# Patient Record
Sex: MALE | Race: WHITE | NOT HISPANIC OR LATINO | Employment: OTHER | URBAN - METROPOLITAN AREA
[De-identification: names, ages, dates, MRNs, and addresses within clinical notes are randomized per-mention and may not be internally consistent; named-entity substitution may affect disease eponyms.]

---

## 2017-01-04 RX ORDER — ROSUVASTATIN CALCIUM 20 MG/1
TABLET, FILM COATED ORAL
Qty: 90 TABLET | Refills: 2 | Status: SHIPPED | OUTPATIENT
Start: 2017-01-04 | End: 2017-12-28 | Stop reason: SDUPTHER

## 2017-01-17 PROBLEM — G50.0 TIC DOULOUREUX: Status: ACTIVE | Noted: 2017-01-17

## 2017-01-17 PROBLEM — I25.10 CAD (CORONARY ARTERY DISEASE): Status: ACTIVE | Noted: 2017-01-17

## 2017-01-17 PROBLEM — I25.9 IHD (ISCHEMIC HEART DISEASE): Status: ACTIVE | Noted: 2017-01-17

## 2017-01-17 PROBLEM — I10 HYPERTENSION: Status: ACTIVE | Noted: 2017-01-17

## 2017-01-17 PROBLEM — E78.5 DYSLIPIDEMIA: Status: ACTIVE | Noted: 2017-01-17

## 2017-12-28 RX ORDER — ROSUVASTATIN CALCIUM 20 MG/1
20 TABLET, COATED ORAL DAILY
Qty: 30 TABLET | Refills: 0 | Status: SHIPPED | OUTPATIENT
Start: 2017-12-28 | End: 2018-01-25 | Stop reason: SDUPTHER

## 2018-01-10 ENCOUNTER — LAB (OUTPATIENT)
Dept: LAB | Facility: HOSPITAL | Age: 63
End: 2018-01-10

## 2018-01-10 ENCOUNTER — OFFICE VISIT (OUTPATIENT)
Dept: CARDIOLOGY | Facility: CLINIC | Age: 63
End: 2018-01-10

## 2018-01-10 VITALS
HEART RATE: 69 BPM | DIASTOLIC BLOOD PRESSURE: 64 MMHG | WEIGHT: 163.6 LBS | BODY MASS INDEX: 24.23 KG/M2 | SYSTOLIC BLOOD PRESSURE: 122 MMHG | OXYGEN SATURATION: 96 % | HEIGHT: 69 IN

## 2018-01-10 DIAGNOSIS — E78.5 DYSLIPIDEMIA: ICD-10-CM

## 2018-01-10 DIAGNOSIS — I25.9 IHD (ISCHEMIC HEART DISEASE): ICD-10-CM

## 2018-01-10 DIAGNOSIS — R25.2 MUSCLE CRAMP: Primary | ICD-10-CM

## 2018-01-10 DIAGNOSIS — R42 LIGHT HEADEDNESS: ICD-10-CM

## 2018-01-10 DIAGNOSIS — I10 HYPERTENSION, UNSPECIFIED TYPE: ICD-10-CM

## 2018-01-10 LAB
ALBUMIN SERPL-MCNC: 4.4 G/DL (ref 3.2–4.8)
ALBUMIN/GLOB SERPL: 1.6 G/DL (ref 1.5–2.5)
ALP SERPL-CCNC: 49 U/L (ref 25–100)
ALT SERPL W P-5'-P-CCNC: 19 U/L (ref 7–40)
ANION GAP SERPL CALCULATED.3IONS-SCNC: 8 MMOL/L (ref 3–11)
ARTICHOKE IGE QN: 44 MG/DL (ref 0–130)
AST SERPL-CCNC: 26 U/L (ref 0–33)
BILIRUB SERPL-MCNC: 1.1 MG/DL (ref 0.3–1.2)
BUN BLD-MCNC: 16 MG/DL (ref 9–23)
BUN/CREAT SERPL: 16 (ref 7–25)
CALCIUM SPEC-SCNC: 8.9 MG/DL (ref 8.7–10.4)
CHLORIDE SERPL-SCNC: 106 MMOL/L (ref 99–109)
CHOLEST SERPL-MCNC: 84 MG/DL (ref 0–200)
CO2 SERPL-SCNC: 27 MMOL/L (ref 20–31)
CREAT BLD-MCNC: 1 MG/DL (ref 0.6–1.3)
GFR SERPL CREATININE-BSD FRML MDRD: 76 ML/MIN/1.73
GLOBULIN UR ELPH-MCNC: 2.8 GM/DL
GLUCOSE BLD-MCNC: 98 MG/DL (ref 70–100)
HDLC SERPL-MCNC: 28 MG/DL (ref 40–60)
POTASSIUM BLD-SCNC: 4.8 MMOL/L (ref 3.5–5.5)
PROT SERPL-MCNC: 7.2 G/DL (ref 5.7–8.2)
SODIUM BLD-SCNC: 141 MMOL/L (ref 132–146)
TRIGL SERPL-MCNC: 79 MG/DL (ref 0–150)

## 2018-01-10 PROCEDURE — 80061 LIPID PANEL: CPT

## 2018-01-10 PROCEDURE — 99213 OFFICE O/P EST LOW 20 MIN: CPT | Performed by: INTERNAL MEDICINE

## 2018-01-10 PROCEDURE — 80053 COMPREHEN METABOLIC PANEL: CPT

## 2018-01-10 RX ORDER — NITROGLYCERIN 400 UG/1
1 SPRAY ORAL
COMMUNITY

## 2018-01-10 NOTE — PROGRESS NOTES
Damien Sorto  1955  [phone]      01/10/2018    Titi Chan MD    Chief Complaint: Coronary Artery Disease      PROBLEM LIST:  1. Ischemic heart disease:  a. History of acute inferior myocardial infarction with symptoms of shortness of breath and severe chest  pain, treated with Streptokinase/PTCA and stent placement to the RCA in February 1998.  b. Left heart catheterization, 02/17/2000, revealing 40% LAD stenosis with an ejection fraction of greater than 60%.  c. Acute inferior myocardial infarction, 09/14/2007,  with placement of 1 stent to the RCA at [facility]; data deficient.  d. Left heart catheterization, 01/08/2009, revealing nonobstructive disease which was diffusely present throughout.  Patent right coronary stent.  LVEF of  55%.  e. GXT December 12, 2017 at [place]: Exercise duration 5 minutes with the achievement of 97% of expected maximal heart rate.  No evidence of ischemia by clinical or echocardiographic criteria.  The stress test was stopped secondary to leg pain and cramps.  There is no dyspnea or diaphoresis or chest pain.  2. Hypertension.  3. Dyslipidemia.  4. Remote tobacco abuse.  5.  Family history of premature coronary artery disease.  6. Tic douloureux.  7. Recent ankle fracture, December 2015.  8. Surgical history.  a.  Hemorrhoidectomy.  b. Vasectomy.  c. Tonsillectomy.  d. Tic douloureux neuralgia surgery.  e. Discectomy.    No Known Allergies    Current Medications:    Current Outpatient Prescriptions:   •  aspirin 81 MG tablet, Take 81 mg by mouth Daily., Disp: , Rfl:   •  Multiple Vitamins-Minerals (MULTIVITAMIN ADULTS 50+ PO), Take  by mouth As Needed., Disp: , Rfl:   •  nitroglycerin (NITROLINGUAL) 0.4 MG/SPRAY spray, Place 1 spray under the tongue Every 5 (Five) Minutes As Needed for Chest Pain., Disp: , Rfl:   •  rosuvastatin (CRESTOR) 20 MG tablet, Take 1 tablet by mouth Daily. Need lab work for further refills, Disp: 30 tablet,  "Rfl: 0    HPI  Damien Sorto returns today for follow up with a history of ischemic heart disease, hypertension, and dyslipidemia. Since last visit, patient has had a variety of issues with his legs and feet. He has been experiencing muscle cramps in his calves whenever he walks, especially when going uphill. When he stops and rests the cramps go away. He can walk about a half mile before feeling discomfort. His feels like his feet are swollen 3/4 of the time, and they stay \"ice cold.\" His last stress test was in December 2017. He also feels light headed when he stands up. This light-headedness goes away in a few seconds. Denies any complaints of chest pain, pressure, tightness, or unusual dyspnea. He no longer smokes. He follows a routine exercise regimen by walking half a mile every day.     The following portions of the patient's history were reviewed and updated as appropriate: allergies, current medications and problem list.    Pertinent positives as listed in the HPI.  All other systems reviewed are negative.    Vitals:    01/10/18 1312   BP: 122/64   BP Location: Left arm   Patient Position: Sitting   Pulse: 69   SpO2: 96%   Weight: 74.2 kg (163 lb 9.6 oz)   Height: 175.3 cm (69\")       General: Alert, awake, and oriented  Neck: Jugular venous pressure is within normal limits. Carotids have normal upstrokes without bruits.   Cardiovascular: Heart has a nondisplaced focal PMI. Regular rate and rhythm without murmur, gallop or rub.  Lungs: Clear without rales or wheezes. Equal expansion is noted.   Extremities: Show no edema.Pulses are palpable through this thick socks.  Skin: Warm and dry.  Neurologic: nonfocal    Diagnostic Data:  Procedures    Assessment:    ICD-10-CM ICD-9-CM   1. Muscle cramp R25.2 729.82   2. Light headedness R42 780.4   3. IHD (ischemic heart disease) I25.9 414.9   4. Hypertension, unspecified type I10 401.9   5. Dyslipidemia E78.5 272.4       Plan:    1. Continue a routine exercise " regimen; 30 minutes per day, 4-5 days per week   2. Perform Exercise JUDY to look for PVD today, January 10, 2018  3. Monitor BP at home to see if low BP is contributing to light-headedness   4. Start salting food to increase BP  5. Obtain lipid panel today, January 10, 2018  6. Continue current medications.  7. F/up in 12 months or sooner if needed.      Scribed for Yola Gleason MD by Sonali Lou. 1/10/2018  1:50 PM    I Yola Gleason MD personally performed the services described in this documentation as scribed by the above individual in my presence, and it is both accurate and complete.    Yola Gleason MD, FACC

## 2018-01-25 ENCOUNTER — HOSPITAL ENCOUNTER (OUTPATIENT)
Dept: CARDIOLOGY | Facility: HOSPITAL | Age: 63
Discharge: HOME | End: 2018-01-25
Attending: INTERNAL MEDICINE | Admitting: INTERNAL MEDICINE

## 2018-01-25 DIAGNOSIS — R25.2 MUSCLE CRAMP: ICD-10-CM

## 2018-01-25 LAB
BH CV LOWER ARTERIAL LEFT ABI RATIO: 0.67
BH CV LOWER ARTERIAL LEFT DORSALIS PEDIS SYS MAX: 94 MMHG
BH CV LOWER ARTERIAL LEFT POST TIBIAL SYS MAX: 91 MMHG
BH CV LOWER ARTERIAL RIGHT ABI RATIO: 0.86
BH CV LOWER ARTERIAL RIGHT DORSALIS PEDIS SYS MAX: 120 MMHG
BH CV LOWER ARTERIAL RIGHT POST TIBIAL SYS MAX: 106 MMHG
UPPER ARTERIAL LEFT ARM BRACHIAL SYS MAX: 140 MMHG
UPPER ARTERIAL RIGHT ARM BRACHIAL SYS MAX: 132 MMHG

## 2018-01-25 PROCEDURE — 93923 UPR/LXTR ART STDY 3+ LVLS: CPT

## 2018-01-25 PROCEDURE — 93923 UPR/LXTR ART STDY 3+ LVLS: CPT | Performed by: INTERNAL MEDICINE

## 2018-01-25 RX ORDER — ROSUVASTATIN CALCIUM 20 MG/1
20 TABLET, COATED ORAL DAILY
Qty: 90 TABLET | Refills: 1 | Status: SHIPPED | OUTPATIENT
Start: 2018-01-25 | End: 2018-08-29 | Stop reason: SDUPTHER

## 2018-01-30 ENCOUNTER — TELEPHONE (OUTPATIENT)
Dept: CARDIOLOGY | Facility: CLINIC | Age: 63
End: 2018-01-30

## 2018-01-30 NOTE — TELEPHONE ENCOUNTER
Called pt to let him know that I am waiting for [facility] to go over results and give me instruction- there was no answer, no VM

## 2018-02-01 ENCOUNTER — TELEPHONE (OUTPATIENT)
Dept: CARDIOLOGY | Facility: CLINIC | Age: 63
End: 2018-02-01

## 2018-02-01 DIAGNOSIS — R68.89 ABNORMAL ANKLE BRACHIAL INDEX (ABI): Primary | ICD-10-CM

## 2018-02-01 NOTE — TELEPHONE ENCOUNTER
LM to call me back to discuss recent JUDY results- PT needs an AA r/o -  would like for Jerrod Rodriguez with [facility] to be present for this procedure- his number [phone]

## 2018-02-06 PROBLEM — R68.89 ABNORMAL ANKLE BRACHIAL INDEX (ABI): Status: ACTIVE | Noted: 2018-02-06

## 2018-02-07 ENCOUNTER — PREP FOR SURGERY (OUTPATIENT)
Dept: OTHER | Facility: HOSPITAL | Age: 63
End: 2018-02-07

## 2018-02-07 DIAGNOSIS — R68.89 ABNORMAL ANKLE BRACHIAL INDEX (ABI): Primary | ICD-10-CM

## 2018-02-07 RX ORDER — ASPIRIN 325 MG
325 TABLET ORAL ONCE
Status: CANCELLED | OUTPATIENT
Start: 2018-02-07 | End: 2018-02-07

## 2018-02-07 RX ORDER — NITROGLYCERIN 0.4 MG/1
0.4 TABLET SUBLINGUAL
Status: CANCELLED | OUTPATIENT
Start: 2018-02-07

## 2018-02-07 RX ORDER — ASPIRIN 325 MG
325 TABLET, DELAYED RELEASE (ENTERIC COATED) ORAL DAILY
Status: CANCELLED | OUTPATIENT
Start: 2018-02-08

## 2018-02-07 RX ORDER — ACETAMINOPHEN 325 MG/1
650 TABLET ORAL EVERY 4 HOURS PRN
Status: CANCELLED | OUTPATIENT
Start: 2018-02-07

## 2018-02-07 RX ORDER — ONDANSETRON 2 MG/ML
4 INJECTION INTRAMUSCULAR; INTRAVENOUS EVERY 6 HOURS PRN
Status: CANCELLED | OUTPATIENT
Start: 2018-02-07

## 2018-02-07 RX ORDER — SODIUM CHLORIDE 0.9 % (FLUSH) 0.9 %
1-10 SYRINGE (ML) INJECTION AS NEEDED
Status: CANCELLED | OUTPATIENT
Start: 2018-02-07

## 2018-02-16 ENCOUNTER — HOSPITAL ENCOUNTER (OUTPATIENT)
Facility: HOSPITAL | Age: 63
Setting detail: HOSPITAL OUTPATIENT SURGERY
Discharge: HOME | End: 2018-02-16
Attending: INTERNAL MEDICINE | Admitting: INTERNAL MEDICINE

## 2018-02-16 VITALS
RESPIRATION RATE: 16 BRPM | OXYGEN SATURATION: 99 % | WEIGHT: 166.45 LBS | HEART RATE: 64 BPM | SYSTOLIC BLOOD PRESSURE: 123 MMHG | BODY MASS INDEX: 24.65 KG/M2 | HEIGHT: 69 IN | TEMPERATURE: 98.2 F | DIASTOLIC BLOOD PRESSURE: 73 MMHG

## 2018-02-16 DIAGNOSIS — R68.89 ABNORMAL ANKLE BRACHIAL INDEX (ABI): ICD-10-CM

## 2018-02-16 LAB
ACT BLD: 169 SECONDS (ref 82–152)
ACT BLD: 202 SECONDS (ref 82–152)
ACT BLD: 219 SECONDS (ref 82–152)
ACT BLD: 252 SECONDS (ref 82–152)
ACT BLD: 268 SECONDS (ref 82–152)
ACT BLD: 329 SECONDS (ref 82–152)
ALBUMIN SERPL-MCNC: 4.4 G/DL (ref 3.2–4.8)
ALBUMIN/GLOB SERPL: 1.4 G/DL (ref 1.5–2.5)
ALP SERPL-CCNC: 56 U/L (ref 25–100)
ALT SERPL W P-5'-P-CCNC: 22 U/L (ref 7–40)
ANION GAP SERPL CALCULATED.3IONS-SCNC: 6 MMOL/L (ref 3–11)
ARTICHOKE IGE QN: 58 MG/DL (ref 0–130)
AST SERPL-CCNC: 28 U/L (ref 0–33)
BILIRUB SERPL-MCNC: 0.7 MG/DL (ref 0.3–1.2)
BUN BLD-MCNC: 12 MG/DL (ref 9–23)
BUN/CREAT SERPL: 13.3 (ref 7–25)
CALCIUM SPEC-SCNC: 9.4 MG/DL (ref 8.7–10.4)
CHLORIDE SERPL-SCNC: 108 MMOL/L (ref 99–109)
CHOLEST SERPL-MCNC: 107 MG/DL (ref 0–200)
CO2 SERPL-SCNC: 28 MMOL/L (ref 20–31)
CREAT BLD-MCNC: 0.9 MG/DL (ref 0.6–1.3)
DEPRECATED RDW RBC AUTO: 64.3 FL (ref 37–54)
ERYTHROCYTE [DISTWIDTH] IN BLOOD BY AUTOMATED COUNT: 13.8 % (ref 11.3–14.5)
GFR SERPL CREATININE-BSD FRML MDRD: 86 ML/MIN/1.73
GLOBULIN UR ELPH-MCNC: 3.2 GM/DL
GLUCOSE BLD-MCNC: 109 MG/DL (ref 70–100)
HBA1C MFR BLD: 5.7 % (ref 4.8–5.6)
HCT VFR BLD AUTO: 32.5 % (ref 38.9–50.9)
HDLC SERPL-MCNC: 38 MG/DL (ref 40–60)
HGB BLD-MCNC: 11.5 G/DL (ref 13.1–17.5)
MCH RBC QN AUTO: 45.3 PG (ref 27–31)
MCHC RBC AUTO-ENTMCNC: 35.4 G/DL (ref 32–36)
MCV RBC AUTO: 128 FL (ref 80–99)
PLATELET # BLD AUTO: 186 10*3/MM3 (ref 150–450)
PMV BLD AUTO: 10.6 FL (ref 6–12)
POTASSIUM BLD-SCNC: 3.6 MMOL/L (ref 3.5–5.5)
PROT SERPL-MCNC: 7.6 G/DL (ref 5.7–8.2)
RBC # BLD AUTO: 2.54 10*6/MM3 (ref 4.2–5.76)
SODIUM BLD-SCNC: 142 MMOL/L (ref 132–146)
TRIGL SERPL-MCNC: 122 MG/DL (ref 0–150)
WBC NRBC COR # BLD: 4.5 10*3/MM3 (ref 3.5–10.8)

## 2018-02-16 PROCEDURE — C1887 CATHETER, GUIDING: HCPCS | Performed by: INTERNAL MEDICINE

## 2018-02-16 PROCEDURE — C2623 CATH, TRANSLUMIN, DRUG-COAT: HCPCS | Performed by: INTERNAL MEDICINE

## 2018-02-16 PROCEDURE — 80061 LIPID PANEL: CPT | Performed by: NURSE PRACTITIONER

## 2018-02-16 PROCEDURE — C1884 EMBOLIZATION PROTECT SYST: HCPCS | Performed by: INTERNAL MEDICINE

## 2018-02-16 PROCEDURE — 0 IOPAMIDOL PER 1 ML

## 2018-02-16 PROCEDURE — G0378 HOSPITAL OBSERVATION PER HR: HCPCS

## 2018-02-16 PROCEDURE — 25010000002 FENTANYL CITRATE (PF) 100 MCG/2ML SOLUTION: Performed by: INTERNAL MEDICINE

## 2018-02-16 PROCEDURE — 37225 PR REVSC OPN/PRQ FEM/POP W/ATHRC/ANGIOP SM VSL: CPT | Performed by: INTERNAL MEDICINE

## 2018-02-16 PROCEDURE — 25010000002 HEPARIN (PORCINE) PER 1000 UNITS: Performed by: INTERNAL MEDICINE

## 2018-02-16 PROCEDURE — 25010000002 MIDAZOLAM PER 1 MG: Performed by: INTERNAL MEDICINE

## 2018-02-16 PROCEDURE — 83036 HEMOGLOBIN GLYCOSYLATED A1C: CPT | Performed by: NURSE PRACTITIONER

## 2018-02-16 PROCEDURE — C1769 GUIDE WIRE: HCPCS | Performed by: INTERNAL MEDICINE

## 2018-02-16 PROCEDURE — 80053 COMPREHEN METABOLIC PANEL: CPT | Performed by: NURSE PRACTITIONER

## 2018-02-16 PROCEDURE — 75630 X-RAY AORTA LEG ARTERIES: CPT | Performed by: INTERNAL MEDICINE

## 2018-02-16 PROCEDURE — C1714 CATH, TRANS ATHERECTOMY, DIR: HCPCS | Performed by: INTERNAL MEDICINE

## 2018-02-16 PROCEDURE — 85027 COMPLETE CBC AUTOMATED: CPT | Performed by: NURSE PRACTITIONER

## 2018-02-16 PROCEDURE — C1894 INTRO/SHEATH, NON-LASER: HCPCS | Performed by: INTERNAL MEDICINE

## 2018-02-16 PROCEDURE — 85347 COAGULATION TIME ACTIVATED: CPT

## 2018-02-16 PROCEDURE — C1725 CATH, TRANSLUMIN NON-LASER: HCPCS | Performed by: INTERNAL MEDICINE

## 2018-02-16 PROCEDURE — 36415 COLL VENOUS BLD VENIPUNCTURE: CPT

## 2018-02-16 RX ORDER — LIDOCAINE HYDROCHLORIDE 10 MG/ML
INJECTION, SOLUTION EPIDURAL; INFILTRATION; INTRACAUDAL; PERINEURAL AS NEEDED
Status: DISCONTINUED | OUTPATIENT
Start: 2018-02-16 | End: 2018-02-16 | Stop reason: HOSPADM

## 2018-02-16 RX ORDER — ONDANSETRON 2 MG/ML
4 INJECTION INTRAMUSCULAR; INTRAVENOUS EVERY 6 HOURS PRN
Status: DISCONTINUED | OUTPATIENT
Start: 2018-02-16 | End: 2018-02-19 | Stop reason: HOSPADM

## 2018-02-16 RX ORDER — MORPHINE SULFATE 2 MG/ML
1 INJECTION, SOLUTION INTRAMUSCULAR; INTRAVENOUS EVERY 4 HOURS PRN
Status: DISCONTINUED | OUTPATIENT
Start: 2018-02-16 | End: 2018-02-19 | Stop reason: HOSPADM

## 2018-02-16 RX ORDER — BISACODYL 10 MG
10 SUPPOSITORY, RECTAL RECTAL DAILY PRN
Status: DISCONTINUED | OUTPATIENT
Start: 2018-02-16 | End: 2018-02-19 | Stop reason: HOSPADM

## 2018-02-16 RX ORDER — CLOPIDOGREL BISULFATE 75 MG/1
75 TABLET ORAL DAILY
Status: DISCONTINUED | OUTPATIENT
Start: 2018-02-17 | End: 2018-02-19 | Stop reason: HOSPADM

## 2018-02-16 RX ORDER — FENTANYL CITRATE 50 UG/ML
INJECTION, SOLUTION INTRAMUSCULAR; INTRAVENOUS AS NEEDED
Status: DISCONTINUED | OUTPATIENT
Start: 2018-02-16 | End: 2018-02-16 | Stop reason: HOSPADM

## 2018-02-16 RX ORDER — LOSARTAN POTASSIUM 25 MG/1
25 TABLET ORAL DAILY
Status: DISCONTINUED | OUTPATIENT
Start: 2018-02-16 | End: 2018-02-19 | Stop reason: HOSPADM

## 2018-02-16 RX ORDER — NITROGLYCERIN 0.4 MG/1
0.4 TABLET SUBLINGUAL
Status: DISCONTINUED | OUTPATIENT
Start: 2018-02-16 | End: 2018-02-16 | Stop reason: HOSPADM

## 2018-02-16 RX ORDER — CLOPIDOGREL BISULFATE 75 MG/1
TABLET ORAL AS NEEDED
Status: DISCONTINUED | OUTPATIENT
Start: 2018-02-16 | End: 2018-02-16 | Stop reason: HOSPADM

## 2018-02-16 RX ORDER — SENNA AND DOCUSATE SODIUM 50; 8.6 MG/1; MG/1
2 TABLET, FILM COATED ORAL NIGHTLY
Status: DISCONTINUED | OUTPATIENT
Start: 2018-02-16 | End: 2018-02-19 | Stop reason: HOSPADM

## 2018-02-16 RX ORDER — CLOPIDOGREL BISULFATE 75 MG/1
600 TABLET ORAL ONCE
Status: DISCONTINUED | OUTPATIENT
Start: 2018-02-16 | End: 2018-02-19 | Stop reason: HOSPADM

## 2018-02-16 RX ORDER — SODIUM CHLORIDE 0.9 % (FLUSH) 0.9 %
1-10 SYRINGE (ML) INJECTION AS NEEDED
Status: DISCONTINUED | OUTPATIENT
Start: 2018-02-16 | End: 2018-02-16 | Stop reason: HOSPADM

## 2018-02-16 RX ORDER — SODIUM CHLORIDE 9 MG/ML
100 INJECTION, SOLUTION INTRAVENOUS CONTINUOUS
Status: ACTIVE | OUTPATIENT
Start: 2018-02-16 | End: 2018-02-16

## 2018-02-16 RX ORDER — HEPARIN SODIUM 1000 [USP'U]/ML
INJECTION, SOLUTION INTRAVENOUS; SUBCUTANEOUS AS NEEDED
Status: DISCONTINUED | OUTPATIENT
Start: 2018-02-16 | End: 2018-02-16 | Stop reason: HOSPADM

## 2018-02-16 RX ORDER — MIDAZOLAM HYDROCHLORIDE 1 MG/ML
INJECTION INTRAMUSCULAR; INTRAVENOUS AS NEEDED
Status: DISCONTINUED | OUTPATIENT
Start: 2018-02-16 | End: 2018-02-16 | Stop reason: HOSPADM

## 2018-02-16 RX ORDER — ASPIRIN 325 MG
325 TABLET ORAL ONCE
Status: COMPLETED | OUTPATIENT
Start: 2018-02-16 | End: 2018-02-16

## 2018-02-16 RX ORDER — NALOXONE HCL 0.4 MG/ML
0.4 VIAL (ML) INJECTION
Status: DISCONTINUED | OUTPATIENT
Start: 2018-02-16 | End: 2018-02-19 | Stop reason: HOSPADM

## 2018-02-16 RX ORDER — ASPIRIN 325 MG
325 TABLET, DELAYED RELEASE (ENTERIC COATED) ORAL DAILY
Status: DISCONTINUED | OUTPATIENT
Start: 2018-02-17 | End: 2018-02-16 | Stop reason: HOSPADM

## 2018-02-16 RX ORDER — TEMAZEPAM 7.5 MG/1
7.5 CAPSULE ORAL NIGHTLY PRN
Status: DISCONTINUED | OUTPATIENT
Start: 2018-02-16 | End: 2018-02-19 | Stop reason: HOSPADM

## 2018-02-16 RX ORDER — ONDANSETRON 2 MG/ML
4 INJECTION INTRAMUSCULAR; INTRAVENOUS EVERY 6 HOURS PRN
Status: DISCONTINUED | OUTPATIENT
Start: 2018-02-16 | End: 2018-02-16 | Stop reason: HOSPADM

## 2018-02-16 RX ORDER — HYDROCODONE BITARTRATE AND ACETAMINOPHEN 7.5; 325 MG/1; MG/1
1 TABLET ORAL EVERY 4 HOURS PRN
Status: DISCONTINUED | OUTPATIENT
Start: 2018-02-16 | End: 2018-02-19 | Stop reason: HOSPADM

## 2018-02-16 RX ORDER — SODIUM CHLORIDE 9 MG/ML
100 INJECTION, SOLUTION INTRAVENOUS CONTINUOUS
Status: DISCONTINUED | OUTPATIENT
Start: 2018-02-16 | End: 2018-02-19 | Stop reason: HOSPADM

## 2018-02-16 RX ORDER — ATORVASTATIN CALCIUM 40 MG/1
40 TABLET, FILM COATED ORAL NIGHTLY
Status: DISCONTINUED | OUTPATIENT
Start: 2018-02-16 | End: 2018-02-19 | Stop reason: HOSPADM

## 2018-02-16 RX ORDER — ACETAMINOPHEN 325 MG/1
650 TABLET ORAL EVERY 4 HOURS PRN
Status: DISCONTINUED | OUTPATIENT
Start: 2018-02-16 | End: 2018-02-16 | Stop reason: HOSPADM

## 2018-02-16 RX ORDER — SODIUM CHLORIDE 9 MG/ML
1-3 INJECTION, SOLUTION INTRAVENOUS CONTINUOUS
Status: DISCONTINUED | OUTPATIENT
Start: 2018-02-16 | End: 2018-02-16

## 2018-02-16 RX ORDER — ACETAMINOPHEN 325 MG/1
650 TABLET ORAL EVERY 4 HOURS PRN
Status: DISCONTINUED | OUTPATIENT
Start: 2018-02-16 | End: 2018-02-19 | Stop reason: HOSPADM

## 2018-02-16 RX ADMIN — SODIUM CHLORIDE 3 ML/KG/HR: 9 INJECTION, SOLUTION INTRAVENOUS at 07:59

## 2018-02-16 RX ADMIN — ASPIRIN 325 MG ORAL TABLET 325 MG: 325 PILL ORAL at 07:59

## 2018-02-16 NOTE — H&P
[facility] Consult Note      Referring Provider: Yola Gleason,*  Primary Provider:  Titi Chan MD  Reason for Consultation: Abnormal JUDY    Patient Care Team:  Titi Chan MD as PCP - General    Chief complaint:  Abnormal JUDY    Identification:  62 year old male     Problem list:    1. Ischemic heart disease:  a. History of acute inferior myocardial infarction with symptoms of shortness of breath and severe chest  pain, treated with Streptokinase/PTCA and stent placement to the RCA in February 1998.  b. Left heart catheterization, 02/17/2000, revealing 40% LAD stenosis with an ejection fraction of greater than 60%.  c. Acute inferior myocardial infarction, 09/14/2007,  with placement of 1 stent to the RCA at [facility]; data deficient.  d. Left heart catheterization, 01/08/2009, revealing nonobstructive disease which was diffusely present throughout.  Patent right coronary stent.  LVEF of  55%.  e. GXT December 12, 2017 at [place]: Exercise duration 5 minutes with the achievement of 97% of expected maximal heart rate.  No evidence of ischemia by clinical or echocardiographic criteria.  The stress test was stopped secondary to leg pain and cramps.  There is no dyspnea or diaphoresis or chest pain.  2. Claudication  a. JUDY 1/25/18:  R:  0.86 c/w tib-peroneal disease; L:  0.67 c/w  Popliteal disease  3. Hypertension.  4. Dyslipidemia.  5. Remote tobacco abuse.  6.  Family history of premature coronary artery disease.  7. Tic douloureux.  8. Recent ankle fracture, December 2015.  9. Surgical history.  a.  Hemorrhoidectomy.  b. Vasectomy.  c. Tonsillectomy.  d. Tic douloureux neuralgia surgery.  e. Discectomy.    Allergies:  Review of patient's allergies indicates no known allergies.    Home/Current Medications:    1.  Aspirin 81 mg  2.  Multivitamin daily  3.  Nitroglycerin spray as needed  4.  Crestor 20 mg daily at bedtime      History of present illness:     Patient is a pleasant 62-year-old white male who presents today for further evaluation of his abnormal ABIs that were performed in January.  He states that he has been experiencing claudication symptoms in his calves whenever he walks especially when going uphill.  Once he stops to rest his symptoms are alleviated.  He notes that he can only walk approximately 1/2 mile before noticing this discomfort.  He also notes that his feet feel ice cold majority of the time.  He tries to maintain a walking regimen up to 1/2 mile daily but states that he cannot tolerate anymore at this time due to his claudication.  His most recent JUDY indicates that his disease is greater on the left than it is the right.  From the cardiac standpoint, he is doing well with his last stress testing in December 2017.  He denies having any complaints of chest pain, shortness of breath, dyspnea on exertion, edema, fatigue, palpitations, dizziness and syncope.  He continues to not smoke.      Cardiac Risk Factors:  Hypertension, hyperlipidemia, tobacco abuse, advanced age male, early onset CAD    Stress test within last 6 months:  12/12/2017 in [place]   Details:  Exercise duration 5 minutes with the achievement of 97% of expected maximal heart rate.  No evidence of ischemia by clinical or echocardiographic criteria.  The stress test was stopped secondary to leg pain and cramps.  There is no dyspnea or diaphoresis or chest pain.    Previous cardiac catheterization:  1/8/2009   Details:  nonobstructive disease which was diffusely present throughout.  Patent RCA stent.  LVEF of  55%.       Social History:  Social History     Social History   • Marital status:      Spouse name: N/A   • Number of children: N/A   • Years of education: N/A     Occupational History   • Not on file.     Social History Main Topics   • Smoking status: Former Smoker     Types: Cigars     Quit date: 2016   • Smokeless tobacco: Current User     Types: Snuff   •  "Alcohol use No   • Drug use: No   • Sexual activity: Defer     Other Topics Concern   • Not on file     Social History Narrative     Family History:  Family History   Problem Relation Age of Onset   • Coronary artery disease Other      Review of Systems  Pertinent positives are listed in the HPI.  All other systems reviewed are negative.         Objective     Vital Sign Min/Max for last 24 hours  Temp  Min: 98.2 °F (36.8 °C)  Max: 98.2 °F (36.8 °C)   BP  Min: 129/70  Max: 129/70   Pulse  Min: 63  Max: 63   Resp  Min: 20  Max: 20   SpO2  Min: 99 %  Max: 99 %   No Data Recorded   Weight  Min: 75.5 kg (166 lb 7.2 oz)  Max: 75.5 kg (166 lb 7.2 oz)     Flowsheet Rows         First Filed Value    Admission Height  175.3 cm (69\") Documented at 02/16/2018 0734    Admission Weight  75.5 kg (166 lb 7.2 oz) Documented at 02/16/2018 0734          Physical Exam:    GENERAL: well-developed, well-nourished; in no acute distress.   NECK:  There is no jugular venous distention at 30°.  Carotid upstrokes are 2+ and  symmetrical without bruits.   LUNGS: clear to auscultation bilaterally without wheezing, rhonchi, or rales noted.   CARDIOVASCULAR: The heart has a regular rate with a normal S1 and S2. There is no murmur, gallop, rub, or click appreciated. The PMI is nondisplaced.   ABDOMEN: Soft and nontender  NEUROLOGICAL: Nonfocal; Alert and oriented  PERIPHERAL VASCULAR:  Posterior tibial and dorsalis pedis pulses are 2+ and symmetrical. There is no peripheral edema.   MUSCULOSKELETAL:  Normal ROM  SKIN:  Warm and dry  PSYCHIATRIC: normal mood and affect; behavior appropriate     EKG:  NSR    Labs:        Results from last 7 days  Lab Units 02/16/18  0742   SODIUM mmol/L 142   POTASSIUM mmol/L 3.6   CHLORIDE mmol/L 108   CO2 mmol/L 28.0   BUN mg/dL 12   CREATININE mg/dL 0.90   CALCIUM mg/dL 9.4   BILIRUBIN mg/dL 0.7   ALK PHOS U/L 56   ALT (SGPT) U/L 22   AST (SGOT) U/L 28   GLUCOSE mg/dL 109*       Lab Results (last 24 hours)     " Procedure Component Value Units Date/Time    CBC (No Diff) [[id number]]  (Abnormal) Collected:  02/16/18 0742    Specimen:  Blood Updated:  02/16/18 0759     WBC 4.50 10*3/mm3      RBC 2.54 (L) 10*6/mm3      Hemoglobin 11.5 (L) g/dL      Hematocrit 32.5 (L) %      .0 (H) fL      MCH 45.3 (H) pg      MCHC 35.4 g/dL      RDW 13.8 %      RDW-SD 64.3 (H) fl      MPV 10.6 fL      Platelets 186 10*3/mm3     Lipid Panel [[id number]]  (Abnormal) Collected:  02/16/18 0742    Specimen:  Blood Updated:  02/16/18 0818     Total Cholesterol 107 mg/dL      Triglycerides 122 mg/dL      HDL Cholesterol 38 (L) mg/dL      LDL Cholesterol  58 mg/dL     Hemoglobin A1c [[id number]]  (Abnormal) Collected:  02/16/18 0742    Specimen:  Blood Updated:  02/16/18 0834     Hemoglobin A1C 5.70 (H) %     Narrative:       The American Diabetes Association recommends maintenance of Hemoglobin A1C at 7.0% or lower. Goals for Hemoglobin A1C reduction may need to be modified if hypoglycemia is a problem.           Ejection Fraction:  Normal per cath 2009      Results Review:  I reviewed the patients new clinical results.      Assessment:  1.  Peripheral vascular disease   -Abnormal JUDY January 25, 2018  Right 0.86, left 0.67  2.  Coronary artery disease   -patent RCA stent 2009   -Normal stress test December 2017  3.  Hypertension  4.  Hyperlipidemia        Plan:  AA with runoffs with Dr. Gleason today.  The risks, benefits, potential complications of all been discussed with the patient and he is agreeable to proceed.  Groin access will be utilized and he does not have any upcoming surgeries planned.    I discussed the patients findings and my recommendations with patient.    Scribed for Yloa Gleason MD by JAI Galeas on 02/06/2018 at 8:38 AM      JAI Quintero  02/16/18  8:34 AM    I Yola Gleason MD personally performed the services described in this documentation as scribed by the above individual in my  presence, and it is both accurate and complete.    Yola Gleason MD, FACC

## 2018-02-16 NOTE — PLAN OF CARE
Problem: Cardiac Catheterization with/without PCI (Adult)  Goal: Signs and Symptoms of Listed Potential Problems Will be Absent or Manageable (Cardiac Catheterization with/without PCI)  Outcome: Ongoing (interventions implemented as appropriate)

## 2018-02-17 PROCEDURE — G0378 HOSPITAL OBSERVATION PER HR: HCPCS

## 2018-02-18 PROCEDURE — G0378 HOSPITAL OBSERVATION PER HR: HCPCS

## 2018-03-13 DIAGNOSIS — I73.9 PAD (PERIPHERAL ARTERY DISEASE) (HCC): Primary | ICD-10-CM

## 2018-03-15 ENCOUNTER — TELEPHONE (OUTPATIENT)
Dept: CARDIOLOGY | Facility: CLINIC | Age: 63
End: 2018-03-15

## 2018-03-15 NOTE — TELEPHONE ENCOUNTER
PT called yesterday stating that he is having bad leg pain s/p his AA R/O- Dr. Gleason states that there is a nerve that runs parallel to the artery that she went down for the AA R/O and that sometimes people do experience pain but that it will get better- should he still be experiencing the pain in a few weeks, he should call us back- I relayed this info to the pt- and he verbalized understanding-

## 2018-04-11 ENCOUNTER — HOSPITAL ENCOUNTER (OUTPATIENT)
Dept: CARDIOLOGY | Facility: HOSPITAL | Age: 63
Discharge: HOME | End: 2018-04-11
Attending: INTERNAL MEDICINE | Admitting: INTERNAL MEDICINE

## 2018-04-11 ENCOUNTER — OFFICE VISIT (OUTPATIENT)
Dept: CARDIOLOGY | Facility: CLINIC | Age: 63
End: 2018-04-11

## 2018-04-11 VITALS
HEIGHT: 69 IN | SYSTOLIC BLOOD PRESSURE: 118 MMHG | WEIGHT: 172.4 LBS | BODY MASS INDEX: 25.53 KG/M2 | DIASTOLIC BLOOD PRESSURE: 76 MMHG | HEART RATE: 76 BPM

## 2018-04-11 DIAGNOSIS — I25.9 IHD (ISCHEMIC HEART DISEASE): ICD-10-CM

## 2018-04-11 DIAGNOSIS — I73.9 PAD (PERIPHERAL ARTERY DISEASE) (HCC): ICD-10-CM

## 2018-04-11 DIAGNOSIS — I73.9 PVD (PERIPHERAL VASCULAR DISEASE) (HCC): Primary | ICD-10-CM

## 2018-04-11 DIAGNOSIS — I10 HYPERTENSION, UNSPECIFIED TYPE: ICD-10-CM

## 2018-04-11 DIAGNOSIS — I25.10 CORONARY ARTERY DISEASE INVOLVING NATIVE CORONARY ARTERY OF NATIVE HEART WITHOUT ANGINA PECTORIS: ICD-10-CM

## 2018-04-11 LAB
BH CV LOWER ARTERIAL LEFT ABI RATIO: 0.95
BH CV LOWER ARTERIAL LEFT DORSALIS PEDIS SYS MAX: 116 MMHG
BH CV LOWER ARTERIAL LEFT POST EX ABI RATIO: 0.51
BH CV LOWER ARTERIAL LEFT POST TIBIAL SYS MAX: 112 MMHG
BH CV LOWER ARTERIAL RIGHT ABI RATIO: 1.04
BH CV LOWER ARTERIAL RIGHT DORSALIS PEDIS SYS MAX: 127 MMHG
BH CV LOWER ARTERIAL RIGHT POST EX ABI RATIO: 0.77
BH CV LOWER ARTERIAL RIGHT POST TIBIAL SYS MAX: 118 MMHG
UPPER ARTERIAL LEFT ARM BRACHIAL SYS MAX: 122 MMHG
UPPER ARTERIAL RIGHT ARM BRACHIAL SYS MAX: 122 MMHG

## 2018-04-11 PROCEDURE — 93923 UPR/LXTR ART STDY 3+ LVLS: CPT | Performed by: INTERNAL MEDICINE

## 2018-04-11 PROCEDURE — 93923 UPR/LXTR ART STDY 3+ LVLS: CPT

## 2018-04-11 PROCEDURE — 99213 OFFICE O/P EST LOW 20 MIN: CPT | Performed by: INTERNAL MEDICINE

## 2018-04-11 RX ORDER — CLOPIDOGREL BISULFATE 75 MG/1
75 TABLET ORAL
COMMUNITY
Start: 2018-03-19 | End: 2018-06-02 | Stop reason: SDUPTHER

## 2018-04-11 NOTE — PROGRESS NOTES
Damien Jenkinsn  1955  [phone]      04/11/2018    Titi Chan MD    Chief Complaint   Patient presents with   • IHD (ischemic heart disease)       Problem List:  1. Ischemic heart disease:  a. History of acute inferior myocardial infarction with symptoms of shortness of breath and severe chest  pain, treated with Streptokinase/PTCA and stent placement to the RCA in February 1998.  b. Left heart catheterization, 02/17/2000, revealing 40% LAD stenosis with an ejection fraction of greater than 60%.  c. Acute inferior myocardial infarction, 09/14/2007,  with placement of 1 stent to the RCA at [facility]; data deficient.  d. Left heart catheterization, 01/08/2009, revealing nonobstructive disease which was diffusely present throughout.  Patent right coronary stent.  LVEF of  55%.  e. GXT December 12, 2017 at [place]: Exercise duration 5 minutes with the achievement of 97% of expected maximal heart rate.  No evidence of ischemia by clinical or echocardiographic criteria.  The stress test was stopped secondary to leg pain and cramps.  There is no dyspnea or diaphoresis or chest pain.  2. Peripheral vascular disease:  a. Doppler ankle brachial index 1/25/18: Right Conclusion: The right JUDY is mildly reduced. Waveforms are consistent with tib-peroneal disease. Left Conclusion: The left JUDY is moderately reduced. Waveforms are consistent with popliteal disease.  b. Abdominal aortogram with runoff to 2/16/2018: Successful atherectomy and drug eluting balloon treatment to the left common femoral artery and the mid left SFA. Residual right SFA disease, asymptomatic at this time.  c. Doppler ankle brachial index for 4/11/2018:  Baseline right 1.04, left 0.96.  Post exercise right 0.77, left 0.51  3. Hypertension.  4. Dyslipidemia.  5. Remote tobacco abuse.  6. Family history of premature coronary artery disease.  7. Tic douloureux.  8. Recent ankle fracture, December 2015.  9. Surgical  "history.  a.  Hemorrhoidectomy.  b. Vasectomy.  c. Tonsillectomy.  d. Tic douloureux neuralgia surgery.  e. Discectomy.    No Known Allergies    Current Medications:      Current Outpatient Prescriptions:   •  aspirin 81 MG tablet, Take 81 mg by mouth Daily., Disp: , Rfl:   •  clopidogrel (PLAVIX) 75 MG tablet, 75 mg., Disp: , Rfl:   •  Multiple Vitamins-Minerals (MULTIVITAMIN ADULTS 50+ PO), Take  by mouth As Needed., Disp: , Rfl:   •  nitroglycerin (NITROLINGUAL) 0.4 MG/SPRAY spray, Place 1 spray under the tongue Every 5 (Five) Minutes As Needed for Chest Pain. PT. STATED THAT HE HAS NEVER HAD TO TAKE THIS., Disp: , Rfl:   •  rosuvastatin (CRESTOR) 20 MG tablet, Take 1 tablet by mouth Daily., Disp: 90 tablet, Rfl: 1    HPI    Damien Sorto presents today for 6 week follow up of ischemic heart disease, hypertension, and dyslipidemia. Since last visit, patient has been doing well from a cardiac standpoint. He was scheduled for an JUDY in January but was not able to preform it due to his inability to walk. Now he is able to walk and did much better on his JUDY today.  He states that it if he stops while walking, his lower extremities swell. He experiences dizziness when he stands up from a sitting position. States that he experiences cold sensation in his right arm. Patient denies chest pain, palpitations, shortness of breath, edema, PND, orthopnea, dizziness, and syncope.     The following portions of the patient's history were reviewed and updated as appropriate: allergies, current medications and problem list.    Pertinent positives as listed in the HPI.  All other systems reviewed are negative.    Vitals:    04/11/18 1321 04/11/18 1344 04/11/18 1345   BP: 104/60 130/80 118/76   BP Location: Left arm Left arm Right arm   Patient Position: Sitting Sitting Sitting   Pulse: 76     Weight: 78.2 kg (172 lb 6.4 oz)     Height: 175.3 cm (69\")         Physical Exam:  General: Alert and oriented to person, place, and " time.  Neck: Jugular venous pressure is within normal limits. Carotids have normal upstrokes without bruits.   Cardiovascular: Regular rate and rhythm without murmur gallop or rub.  Lungs: Clear without rales or wheezes. Equal expansion is noted.   Extremities: Show no edema.  Skin: warm and dry.  Neurologic: nonfocal    Diagnostic Data:    Lab Results   Component Value Date    CHOL 107 02/16/2018    TRIG 122 02/16/2018    HDL 38 (L) 02/16/2018    LDL 58 02/16/2018     Lab Results   Component Value Date    HGBA1C 5.70 (H) 02/16/2018     Lab Results   Component Value Date    GLUCOSE 109 (H) 02/16/2018    BUN 12 02/16/2018    CREATININE 0.90 02/16/2018    EGFRIFNONA 86 02/16/2018    BCR 13.3 02/16/2018    K 3.6 02/16/2018    CO2 28.0 02/16/2018    CALCIUM 9.4 02/16/2018    ALBUMIN 4.40 02/16/2018    LABIL2 1.4 (L) 02/16/2018    AST 28 02/16/2018    ALT 22 02/16/2018     Lab Results   Component Value Date    WBC 4.50 02/16/2018    HGB 11.5 (L) 02/16/2018    HCT 32.5 (L) 02/16/2018    .0 (H) 02/16/2018     02/16/2018     Procedures    Assessment:      ICD-10-CM ICD-9-CM   1. PVD (peripheral vascular disease) I73.9 443.9   2. Coronary artery disease involving native coronary artery of native heart without angina pectoris I25.10 414.01   3. Hypertension, unspecified type I10 401.9   4. IHD (ischemic heart disease) I25.9 414.9       Plan:  1. Continue walking regimen for 30 min 3-4 times a week.  2. Continue current medications.  3. F/up in 6 months or sooner if needed.    Scribed for Yola Gleason MD by Tolu Medrano. 4/11/2018  2:03 PM

## 2018-06-04 RX ORDER — CLOPIDOGREL BISULFATE 75 MG/1
TABLET ORAL
Qty: 90 TABLET | Refills: 3 | Status: SHIPPED | OUTPATIENT
Start: 2018-06-04 | End: 2019-03-14

## 2018-06-13 ENCOUNTER — DOCUMENTATION (OUTPATIENT)
Dept: CARDIOLOGY | Facility: CLINIC | Age: 63
End: 2018-06-13

## 2018-06-13 ENCOUNTER — HOSPITAL ENCOUNTER (OUTPATIENT)
Dept: CARDIOLOGY | Facility: HOSPITAL | Age: 63
Discharge: HOME | End: 2018-06-13
Attending: INTERNAL MEDICINE

## 2018-06-13 DIAGNOSIS — Z98.890 HISTORY OF LEFT HEART CATHETERIZATION (LHC): ICD-10-CM

## 2018-06-13 NOTE — PROGRESS NOTES
Dr. Gleason reviewed echo disc from [facility] and agrees with the report- Will have echo scanned into system-

## 2018-06-18 ENCOUNTER — OFFICE VISIT (OUTPATIENT)
Dept: CARDIOLOGY | Facility: CLINIC | Age: 63
End: 2018-06-18

## 2018-06-18 VITALS
BODY MASS INDEX: 25.18 KG/M2 | HEIGHT: 69 IN | HEART RATE: 64 BPM | DIASTOLIC BLOOD PRESSURE: 58 MMHG | SYSTOLIC BLOOD PRESSURE: 98 MMHG | WEIGHT: 170 LBS

## 2018-06-18 DIAGNOSIS — E78.5 DYSLIPIDEMIA: ICD-10-CM

## 2018-06-18 DIAGNOSIS — I25.9 IHD (ISCHEMIC HEART DISEASE): Primary | ICD-10-CM

## 2018-06-18 DIAGNOSIS — I73.9 PVD (PERIPHERAL VASCULAR DISEASE) (HCC): ICD-10-CM

## 2018-06-18 DIAGNOSIS — I10 ESSENTIAL HYPERTENSION: ICD-10-CM

## 2018-06-18 PROCEDURE — 99214 OFFICE O/P EST MOD 30 MIN: CPT | Performed by: INTERNAL MEDICINE

## 2018-06-18 NOTE — PROGRESS NOTES
Damien Sorto  1955  [phone]      06/18/2018    Titi Chan MD    CC:  Evaluation of recent near syncope.  Problem List:  1. Ischemic heart disease:  a. History of acute inferior myocardial infarction with symptoms of shortness of breath and severe chest  pain, treated with Streptokinase/PTCA and stent placement to the RCA in February 1998.  b. Left heart catheterization, 02/17/2000, revealing 40% LAD stenosis with an ejection fraction of greater than 60%.  c. Acute inferior myocardial infarction, 09/14/2007,  with placement of 1 stent to the RCA at [facility]; data deficient.  d. Left heart catheterization, 01/08/2009, revealing nonobstructive disease which was diffusely present throughout.  Patent right coronary stent.  LVEF of  55%.  e. GXT December 12, 2017 at [place]: Exercise duration 5 minutes with the achievement of 97% of expected maximal heart rate.  No evidence of ischemia by clinical or echocardiographic criteria.  The stress test was stopped secondary to leg pain and cramps.  There is no dyspnea or diaphoresis or chest pain.  f. Near syncopal episode Angella 3, 2018.  Essentially normal cardiac workup with echocardiogram showing EF greater than 60%, no regional wall motion abnormalities.  Images were reviewed by Dr. Gleason who concurred with the findings.  2. Peripheral vascular disease:  a. Doppler ankle brachial index 1/25/18: Right Conclusion: The right JUDY is mildly reduced. Waveforms are consistent with tib-peroneal disease. Left Conclusion: The left JUDY is moderately reduced. Waveforms are consistent with popliteal disease.  b. Abdominal aortogram with runoff to 2/16/2018: Successful atherectomy and drug eluting balloon treatment to the left common femoral artery and the mid left SFA. Residual right SFA disease, asymptomatic at this time.  c. Doppler ankle brachial index for 4/11/2018:  Baseline right 1.04, left 0.96.  Post exercise right 0.77,  "left 0.51  3. Essential Hypertension.  4. Dyslipidemia.  5. Remote smoking tobacco abuse.  Chronic snuff use.  6. Family history of premature coronary artery disease.  7. Tic douloureux.  8. Ankle fracture, December 2015.  9. Peripheral neuropathy  10. Surgical history.  a.  Hemorrhoidectomy.  b. Vasectomy.  c. Tonsillectomy.  d. Tic douloureux neuralgia surgery.  e. Discectomy.    No Known Allergies    Current Medications:      Current Outpatient Prescriptions:   •  aspirin 81 MG tablet, Take 81 mg by mouth Daily., Disp: , Rfl:   •  clopidogrel (PLAVIX) 75 MG tablet, TAKE 1 TABLET BY MOUTH ONCE DAILY, Disp: 90 tablet, Rfl: 3  •  Multiple Vitamins-Minerals (MULTIVITAMIN ADULTS 50+ PO), Take  by mouth As Needed., Disp: , Rfl:   •  nitroglycerin (NITROLINGUAL) 0.4 MG/SPRAY spray, Place 1 spray under the tongue Every 5 (Five) Minutes As Needed for Chest Pain. PT. STATED THAT HE HAS NEVER HAD TO TAKE THIS., Disp: , Rfl:   •  rosuvastatin (CRESTOR) 20 MG tablet, Take 1 tablet by mouth Daily., Disp: 90 tablet, Rfl: 1    HPI    Damien Sorto presents today for 2-3 week follow up of ischemic heart disease, peripheral vascular disease, hypertension, and dyslipidemia. Since last visit, patient has been seen at his local hospital for a near syncopal episode.  He states he woke up early one morning and did not eat any breakfast nor had he taken any medications.  He put a pinch of snuff in his mouth and shortly thereafter felt lightheaded and \"buzzed.\"  His daughters were nearby who felt his pulse and felt like it was irregular and they advised him to go to the local hospital.  He was seen and evaluated at his local hospital for this he appears to [facility]) and underwent an extensive workup.  This workup included an echocardiogram which was read as normal.  Dr. Gleason has reviewed these images and concurs.  Patient denies chest pain, palpitations, shortness of breath, edema, PND, orthopnea, dizziness, and " "recurrent syncope.  He has noticed that his blood pressure has been low since December 2017.  He denies true syncope or blackout spells or fatigue.  He also mentions today that he has numbness of all 10 of his fingertips and numbness in his feet bilaterally.  He feels like he is \"off balance\" because he cannot feel the ground or surface with his feet.  He states that his right lower extremity is now bothering him similar to what the left 100 prior to his intervention.  He notes that the numbness in his left foot did improve following atherectomy.    The following portions of the patient's history were reviewed and updated as appropriate: allergies, current medications and problem list.    Pertinent positives as listed in the HPI.  All other systems reviewed are negative.    Vitals:    06/18/18 1023   BP: 98/58   BP Location: Left arm   Patient Position: Sitting   Pulse: 64   Weight: 77.1 kg (170 lb)   Height: 175.3 cm (69\")       Physical Exam:  General: Alert and oriented to person, place, and time.  Neck: Jugular venous pressure is within normal limits. Carotids have normal upstrokes without bruits.   Cardiovascular: Regular rate and rhythm without murmur gallop or rub.  Lungs: Clear without rales or wheezes. Equal expansion is noted.   Extremities: Show no edema.  Skin: warm and dry.Small area of mild cellulitis from a recent welding burn right lower extremity  Neurologic: nonfocal    Diagnostic Data:    Lab Results   Component Value Date    CHOL 107 02/16/2018    TRIG 122 02/16/2018    HDL 38 (L) 02/16/2018    LDL 58 02/16/2018     Lab Results   Component Value Date    GLUCOSE 109 (H) 02/16/2018    BUN 12 02/16/2018    CREATININE 0.90 02/16/2018    EGFRIFNONA 86 02/16/2018    BCR 13.3 02/16/2018    K 3.6 02/16/2018    CO2 28.0 02/16/2018    CALCIUM 9.4 02/16/2018    ALBUMIN 4.40 02/16/2018    LABIL2 1.4 (L) 02/16/2018    AST 28 02/16/2018    ALT 22 02/16/2018     Lab Results   Component Value Date    WBC 4.50 " 02/16/2018    HGB 11.5 (L) 02/16/2018    HCT 32.5 (L) 02/16/2018    .0 (H) 02/16/2018     02/16/2018       Procedures    Assessment:      ICD-10-CM ICD-9-CM   1. IHD (ischemic heart disease) I25.9 414.9   2. Essential hypertension I10 401.9   3. Dyslipidemia E78.5 272.4   4.  Near syncope  5.  Possible neuropathy as he has numbness in his hands and feet.    Plan:  1. SBP is on the lower end of normal but patient doesn't appear to truly be symptomatic.  Cessation of chewing tobacco was recommended to the patient but he is not interested at this time. He is stable from a CAD standpoint without angina or exertional dyspnea.      2. Continue current medications.  3. Abdominal aortogram with runoff from the left for symptomatic right lower extremity claudication.  4. F/up as previously scheduled, November 7, 2018  sooner if needed.      Scribed for Yola Gleason MD by Tolu Medrano and Jeannie Phillips PA-C. 6/18/2018  10:29 AM     I Yola Gleason MD personally performed the services described in this documentation as scribed by the above individual in my presence, and it is both accurate and complete.    Yola Gleason MD, FACC

## 2018-06-25 ENCOUNTER — APPOINTMENT (OUTPATIENT)
Dept: PREADMISSION TESTING | Facility: HOSPITAL | Age: 63
End: 2018-06-25

## 2018-06-25 ENCOUNTER — PREP FOR SURGERY (OUTPATIENT)
Dept: OTHER | Facility: HOSPITAL | Age: 63
End: 2018-06-25

## 2018-06-25 DIAGNOSIS — I73.9 PERIPHERAL ARTERIAL DISEASE (HCC): Primary | ICD-10-CM

## 2018-06-25 DIAGNOSIS — I73.9 PERIPHERAL ARTERIAL DISEASE (HCC): ICD-10-CM

## 2018-06-25 LAB
ALBUMIN SERPL-MCNC: 4.27 G/DL (ref 3.2–4.8)
ALBUMIN/GLOB SERPL: 1.5 G/DL (ref 1.5–2.5)
ALP SERPL-CCNC: 57 U/L (ref 25–100)
ALT SERPL W P-5'-P-CCNC: 23 U/L (ref 7–40)
ANION GAP SERPL CALCULATED.3IONS-SCNC: 6 MMOL/L (ref 3–11)
AST SERPL-CCNC: 27 U/L (ref 0–33)
BILIRUB SERPL-MCNC: 0.8 MG/DL (ref 0.3–1.2)
BUN BLD-MCNC: 25 MG/DL (ref 9–23)
BUN/CREAT SERPL: 23.8 (ref 7–25)
CALCIUM SPEC-SCNC: 8.7 MG/DL (ref 8.7–10.4)
CHLORIDE SERPL-SCNC: 108 MMOL/L (ref 99–109)
CO2 SERPL-SCNC: 28 MMOL/L (ref 20–31)
CREAT BLD-MCNC: 1.05 MG/DL (ref 0.6–1.3)
DEPRECATED RDW RBC AUTO: 64.7 FL (ref 37–54)
ERYTHROCYTE [DISTWIDTH] IN BLOOD BY AUTOMATED COUNT: 14.5 % (ref 11.3–14.5)
GFR SERPL CREATININE-BSD FRML MDRD: 72 ML/MIN/1.73
GLOBULIN UR ELPH-MCNC: 2.9 GM/DL
GLUCOSE BLD-MCNC: 116 MG/DL (ref 70–100)
HBA1C MFR BLD: 6 % (ref 4.8–5.6)
HCT VFR BLD AUTO: 34.5 % (ref 38.9–50.9)
HGB BLD-MCNC: 11.9 G/DL (ref 13.1–17.5)
MCH RBC QN AUTO: 41.8 PG (ref 27–31)
MCHC RBC AUTO-ENTMCNC: 34.5 G/DL (ref 32–36)
MCV RBC AUTO: 121.1 FL (ref 80–99)
PLATELET # BLD AUTO: 174 10*3/MM3 (ref 150–450)
PMV BLD AUTO: 10.2 FL (ref 6–12)
POTASSIUM BLD-SCNC: 4.7 MMOL/L (ref 3.5–5.5)
PROT SERPL-MCNC: 7.2 G/DL (ref 5.7–8.2)
RBC # BLD AUTO: 2.85 10*6/MM3 (ref 4.2–5.76)
SODIUM BLD-SCNC: 142 MMOL/L (ref 132–146)
WBC NRBC COR # BLD: 4.07 10*3/MM3 (ref 3.5–10.8)

## 2018-06-25 PROCEDURE — 36415 COLL VENOUS BLD VENIPUNCTURE: CPT

## 2018-06-25 PROCEDURE — 85027 COMPLETE CBC AUTOMATED: CPT | Performed by: NURSE PRACTITIONER

## 2018-06-25 PROCEDURE — 83036 HEMOGLOBIN GLYCOSYLATED A1C: CPT | Performed by: NURSE PRACTITIONER

## 2018-06-25 PROCEDURE — 80053 COMPREHEN METABOLIC PANEL: CPT | Performed by: NURSE PRACTITIONER

## 2018-06-25 RX ORDER — ASPIRIN 325 MG
325 TABLET ORAL ONCE
Status: CANCELLED | OUTPATIENT
Start: 2018-06-25 | End: 2018-06-25

## 2018-06-25 RX ORDER — ACETAMINOPHEN 325 MG/1
650 TABLET ORAL EVERY 4 HOURS PRN
Status: CANCELLED | OUTPATIENT
Start: 2018-06-25

## 2018-06-25 RX ORDER — NITROGLYCERIN 0.4 MG/1
0.4 TABLET SUBLINGUAL
Status: CANCELLED | OUTPATIENT
Start: 2018-06-25

## 2018-06-25 RX ORDER — SODIUM CHLORIDE 0.9 % (FLUSH) 0.9 %
1-10 SYRINGE (ML) INJECTION AS NEEDED
Status: CANCELLED | OUTPATIENT
Start: 2018-06-25

## 2018-06-25 RX ORDER — ONDANSETRON 2 MG/ML
4 INJECTION INTRAMUSCULAR; INTRAVENOUS EVERY 6 HOURS PRN
Status: CANCELLED | OUTPATIENT
Start: 2018-06-25

## 2018-06-25 RX ORDER — ASPIRIN 325 MG
325 TABLET, DELAYED RELEASE (ENTERIC COATED) ORAL DAILY
Status: CANCELLED | OUTPATIENT
Start: 2018-06-26

## 2018-06-25 RX ORDER — SODIUM CHLORIDE 9 MG/ML
1-3 INJECTION, SOLUTION INTRAVENOUS CONTINUOUS
Status: CANCELLED | OUTPATIENT
Start: 2018-06-25

## 2018-06-26 ENCOUNTER — HOSPITAL ENCOUNTER (OUTPATIENT)
Facility: HOSPITAL | Age: 63
Discharge: HOME | End: 2018-06-26
Attending: INTERNAL MEDICINE | Admitting: INTERNAL MEDICINE

## 2018-06-26 VITALS
DIASTOLIC BLOOD PRESSURE: 63 MMHG | RESPIRATION RATE: 16 BRPM | TEMPERATURE: 97.2 F | HEART RATE: 59 BPM | BODY MASS INDEX: 24.98 KG/M2 | SYSTOLIC BLOOD PRESSURE: 98 MMHG | WEIGHT: 168.65 LBS | OXYGEN SATURATION: 99 % | HEIGHT: 69 IN

## 2018-06-26 DIAGNOSIS — I73.9 PVD (PERIPHERAL VASCULAR DISEASE) (HCC): ICD-10-CM

## 2018-06-26 DIAGNOSIS — I73.9 PERIPHERAL ARTERIAL DISEASE (HCC): ICD-10-CM

## 2018-06-26 LAB
ACT BLD: 252 SECONDS (ref 82–152)
ACT BLD: 263 SECONDS (ref 82–152)
ARTICHOKE IGE QN: 61 MG/DL (ref 0–130)
CHOLEST SERPL-MCNC: 105 MG/DL (ref 0–200)
HDLC SERPL-MCNC: 29 MG/DL (ref 40–60)
TRIGL SERPL-MCNC: 133 MG/DL (ref 0–150)

## 2018-06-26 PROCEDURE — 37225 PR REVSC OPN/PRQ FEM/POP W/ATHRC/ANGIOP SM VSL: CPT | Performed by: INTERNAL MEDICINE

## 2018-06-26 PROCEDURE — 25010000002 HEPARIN (PORCINE) PER 1000 UNITS: Performed by: INTERNAL MEDICINE

## 2018-06-26 PROCEDURE — 85347 COAGULATION TIME ACTIVATED: CPT

## 2018-06-26 PROCEDURE — 25010000002 MORPHINE SULFATE (PF) 2 MG/ML SOLUTION: Performed by: INTERNAL MEDICINE

## 2018-06-26 PROCEDURE — 25010000002 FENTANYL CITRATE (PF) 100 MCG/2ML SOLUTION: Performed by: INTERNAL MEDICINE

## 2018-06-26 PROCEDURE — C1769 GUIDE WIRE: HCPCS | Performed by: INTERNAL MEDICINE

## 2018-06-26 PROCEDURE — C1894 INTRO/SHEATH, NON-LASER: HCPCS | Performed by: INTERNAL MEDICINE

## 2018-06-26 PROCEDURE — 0 IOPAMIDOL PER 1 ML: Performed by: INTERNAL MEDICINE

## 2018-06-26 PROCEDURE — 80061 LIPID PANEL: CPT | Performed by: INTERNAL MEDICINE

## 2018-06-26 PROCEDURE — 75716 ARTERY X-RAYS ARMS/LEGS: CPT | Performed by: INTERNAL MEDICINE

## 2018-06-26 PROCEDURE — C1887 CATHETER, GUIDING: HCPCS | Performed by: INTERNAL MEDICINE

## 2018-06-26 PROCEDURE — C1884 EMBOLIZATION PROTECT SYST: HCPCS | Performed by: INTERNAL MEDICINE

## 2018-06-26 PROCEDURE — C1714 CATH, TRANS ATHERECTOMY, DIR: HCPCS | Performed by: INTERNAL MEDICINE

## 2018-06-26 PROCEDURE — C2623 CATH, TRANSLUMIN, DRUG-COAT: HCPCS | Performed by: INTERNAL MEDICINE

## 2018-06-26 PROCEDURE — 36415 COLL VENOUS BLD VENIPUNCTURE: CPT

## 2018-06-26 PROCEDURE — 25010000002 MIDAZOLAM PER 1 MG: Performed by: INTERNAL MEDICINE

## 2018-06-26 RX ORDER — ACETAMINOPHEN 325 MG/1
650 TABLET ORAL EVERY 4 HOURS PRN
Status: DISCONTINUED | OUTPATIENT
Start: 2018-06-26 | End: 2018-06-26 | Stop reason: HOSPADM

## 2018-06-26 RX ORDER — ONDANSETRON 2 MG/ML
4 INJECTION INTRAMUSCULAR; INTRAVENOUS EVERY 6 HOURS PRN
Status: DISCONTINUED | OUTPATIENT
Start: 2018-06-26 | End: 2018-06-26 | Stop reason: HOSPADM

## 2018-06-26 RX ORDER — SODIUM CHLORIDE 9 MG/ML
250 INJECTION, SOLUTION INTRAVENOUS ONCE AS NEEDED
Status: DISCONTINUED | OUTPATIENT
Start: 2018-06-26 | End: 2018-06-26 | Stop reason: HOSPADM

## 2018-06-26 RX ORDER — ASPIRIN 325 MG
325 TABLET ORAL ONCE
Status: COMPLETED | OUTPATIENT
Start: 2018-06-26 | End: 2018-06-26

## 2018-06-26 RX ORDER — SODIUM CHLORIDE 9 MG/ML
100 INJECTION, SOLUTION INTRAVENOUS CONTINUOUS
Status: ACTIVE | OUTPATIENT
Start: 2018-06-26 | End: 2018-06-26

## 2018-06-26 RX ORDER — SENNA AND DOCUSATE SODIUM 50; 8.6 MG/1; MG/1
2 TABLET, FILM COATED ORAL NIGHTLY
Status: DISCONTINUED | OUTPATIENT
Start: 2018-06-26 | End: 2018-06-26 | Stop reason: HOSPADM

## 2018-06-26 RX ORDER — ASPIRIN 325 MG
325 TABLET, DELAYED RELEASE (ENTERIC COATED) ORAL DAILY
Status: DISCONTINUED | OUTPATIENT
Start: 2018-06-27 | End: 2018-06-26 | Stop reason: HOSPADM

## 2018-06-26 RX ORDER — HEPARIN SODIUM 1000 [USP'U]/ML
INJECTION, SOLUTION INTRAVENOUS; SUBCUTANEOUS AS NEEDED
Status: DISCONTINUED | OUTPATIENT
Start: 2018-06-26 | End: 2018-06-26 | Stop reason: HOSPADM

## 2018-06-26 RX ORDER — SODIUM CHLORIDE 9 MG/ML
1-3 INJECTION, SOLUTION INTRAVENOUS CONTINUOUS
Status: DISCONTINUED | OUTPATIENT
Start: 2018-06-26 | End: 2018-06-26 | Stop reason: HOSPADM

## 2018-06-26 RX ORDER — CLOPIDOGREL BISULFATE 75 MG/1
75 TABLET ORAL DAILY
Status: DISCONTINUED | OUTPATIENT
Start: 2018-06-27 | End: 2018-06-26 | Stop reason: HOSPADM

## 2018-06-26 RX ORDER — ASPIRIN 81 MG/1
81 TABLET ORAL DAILY
Status: DISCONTINUED | OUTPATIENT
Start: 2018-06-27 | End: 2018-06-26 | Stop reason: HOSPADM

## 2018-06-26 RX ORDER — ATORVASTATIN CALCIUM 40 MG/1
40 TABLET, FILM COATED ORAL NIGHTLY
Status: DISCONTINUED | OUTPATIENT
Start: 2018-06-26 | End: 2018-06-26 | Stop reason: HOSPADM

## 2018-06-26 RX ORDER — BISACODYL 10 MG
10 SUPPOSITORY, RECTAL RECTAL DAILY PRN
Status: DISCONTINUED | OUTPATIENT
Start: 2018-06-26 | End: 2018-06-26 | Stop reason: HOSPADM

## 2018-06-26 RX ORDER — LIDOCAINE HYDROCHLORIDE 10 MG/ML
INJECTION, SOLUTION EPIDURAL; INFILTRATION; INTRACAUDAL; PERINEURAL AS NEEDED
Status: DISCONTINUED | OUTPATIENT
Start: 2018-06-26 | End: 2018-06-26 | Stop reason: HOSPADM

## 2018-06-26 RX ORDER — MIDAZOLAM HYDROCHLORIDE 1 MG/ML
INJECTION INTRAMUSCULAR; INTRAVENOUS AS NEEDED
Status: DISCONTINUED | OUTPATIENT
Start: 2018-06-26 | End: 2018-06-26 | Stop reason: HOSPADM

## 2018-06-26 RX ORDER — CLOPIDOGREL BISULFATE 75 MG/1
TABLET ORAL AS NEEDED
Status: DISCONTINUED | OUTPATIENT
Start: 2018-06-26 | End: 2018-06-26 | Stop reason: HOSPADM

## 2018-06-26 RX ORDER — NALOXONE HCL 0.4 MG/ML
0.4 VIAL (ML) INJECTION
Status: DISCONTINUED | OUTPATIENT
Start: 2018-06-26 | End: 2018-06-26 | Stop reason: HOSPADM

## 2018-06-26 RX ORDER — LOSARTAN POTASSIUM 25 MG/1
25 TABLET ORAL DAILY
Status: DISCONTINUED | OUTPATIENT
Start: 2018-06-26 | End: 2018-06-26 | Stop reason: HOSPADM

## 2018-06-26 RX ORDER — TEMAZEPAM 7.5 MG/1
7.5 CAPSULE ORAL NIGHTLY PRN
Status: DISCONTINUED | OUTPATIENT
Start: 2018-06-26 | End: 2018-06-26 | Stop reason: HOSPADM

## 2018-06-26 RX ORDER — SODIUM CHLORIDE 0.9 % (FLUSH) 0.9 %
1-10 SYRINGE (ML) INJECTION AS NEEDED
Status: DISCONTINUED | OUTPATIENT
Start: 2018-06-26 | End: 2018-06-26 | Stop reason: HOSPADM

## 2018-06-26 RX ORDER — MORPHINE SULFATE 2 MG/ML
1 INJECTION, SOLUTION INTRAMUSCULAR; INTRAVENOUS EVERY 4 HOURS PRN
Status: DISCONTINUED | OUTPATIENT
Start: 2018-06-26 | End: 2018-06-26 | Stop reason: HOSPADM

## 2018-06-26 RX ORDER — NITROGLYCERIN 0.4 MG/1
0.4 TABLET SUBLINGUAL
Status: DISCONTINUED | OUTPATIENT
Start: 2018-06-26 | End: 2018-06-26 | Stop reason: HOSPADM

## 2018-06-26 RX ORDER — FENTANYL CITRATE 50 UG/ML
INJECTION, SOLUTION INTRAMUSCULAR; INTRAVENOUS AS NEEDED
Status: DISCONTINUED | OUTPATIENT
Start: 2018-06-26 | End: 2018-06-26 | Stop reason: HOSPADM

## 2018-06-26 RX ORDER — HYDROCODONE BITARTRATE AND ACETAMINOPHEN 7.5; 325 MG/1; MG/1
1 TABLET ORAL EVERY 4 HOURS PRN
Status: DISCONTINUED | OUTPATIENT
Start: 2018-06-26 | End: 2018-06-26 | Stop reason: HOSPADM

## 2018-06-26 RX ORDER — CLOPIDOGREL BISULFATE 75 MG/1
300 TABLET ORAL ONCE
Status: DISCONTINUED | OUTPATIENT
Start: 2018-06-26 | End: 2018-06-26 | Stop reason: SDUPTHER

## 2018-06-26 RX ADMIN — SODIUM CHLORIDE 3 ML/KG/HR: 9 INJECTION, SOLUTION INTRAVENOUS at 06:53

## 2018-06-26 RX ADMIN — HYDROCODONE BITARTRATE AND ACETAMINOPHEN 1 TABLET: 7.5; 325 TABLET ORAL at 13:41

## 2018-06-26 RX ADMIN — ASPIRIN 325 MG ORAL TABLET 325 MG: 325 PILL ORAL at 06:53

## 2018-06-26 RX ADMIN — MORPHINE SULFATE 1 MG: 2 INJECTION, SOLUTION INTRAMUSCULAR; INTRAVENOUS at 14:25

## 2018-06-29 LAB
ACT BLD: 164 SECONDS (ref 82–152)
ACT BLD: 186 SECONDS (ref 82–152)
ACT BLD: 241 SECONDS (ref 82–152)

## 2018-08-16 ENCOUNTER — HOSPITAL ENCOUNTER (OUTPATIENT)
Age: 63
End: 2018-08-16
Payer: COMMERCIAL

## 2018-08-16 DIAGNOSIS — M25.50: Primary | ICD-10-CM

## 2018-08-16 PROCEDURE — 73630 X-RAY EXAM OF FOOT: CPT

## 2018-08-16 PROCEDURE — 73130 X-RAY EXAM OF HAND: CPT

## 2018-08-29 ENCOUNTER — OFFICE VISIT (OUTPATIENT)
Dept: CARDIOLOGY | Facility: CLINIC | Age: 63
End: 2018-08-29

## 2018-08-29 VITALS
HEIGHT: 69 IN | SYSTOLIC BLOOD PRESSURE: 106 MMHG | BODY MASS INDEX: 25.71 KG/M2 | HEART RATE: 73 BPM | WEIGHT: 173.6 LBS | DIASTOLIC BLOOD PRESSURE: 70 MMHG

## 2018-08-29 DIAGNOSIS — I73.9 PVD (PERIPHERAL VASCULAR DISEASE) (HCC): Primary | ICD-10-CM

## 2018-08-29 DIAGNOSIS — E78.5 DYSLIPIDEMIA: ICD-10-CM

## 2018-08-29 DIAGNOSIS — I10 ESSENTIAL HYPERTENSION: ICD-10-CM

## 2018-08-29 DIAGNOSIS — I25.9 IHD (ISCHEMIC HEART DISEASE): ICD-10-CM

## 2018-08-29 PROCEDURE — 99213 OFFICE O/P EST LOW 20 MIN: CPT | Performed by: INTERNAL MEDICINE

## 2018-08-29 NOTE — PROGRESS NOTES
Damien SKINNER Noreen  1955  [phone]      08/29/2018    Titi Chan MD    Chief Complaint   Patient presents with   • Cardiomyopathy       Problem List:  1. Ischemic heart disease:  a. History of acute inferior myocardial infarction with symptoms of shortness of breath and severe chest  pain, treated with Streptokinase/PTCA and stent placement to the RCA in February 1998.  b. Left heart catheterization, 02/17/2000, revealing 40% LAD stenosis with an ejection fraction of greater than 60%.  c. Acute inferior myocardial infarction, 09/14/2007,  with placement of 1 stent to the RCA at [facility]; data deficient.  d. Left heart catheterization, 01/08/2009, revealing nonobstructive disease which was diffusely present throughout.  Patent right coronary stent.  LVEF of  55%.  e. GXT December 12, 2017 at [place]: Exercise duration 5 minutes with the achievement of 97% of expected maximal heart rate.  No evidence of ischemia by clinical or echocardiographic criteria.  The stress test was stopped secondary to leg pain and cramps.  There is no dyspnea or diaphoresis or chest pain.  f. Near syncopal episode Angella 3, 2018.  Essentially normal cardiac workup with echocardiogram showing EF greater than 60%, no regional wall motion abnormalities.  Images were reviewed by Dr. Gleason who concurred with the findings.  2. Peripheral vascular disease:  a. Doppler ankle brachial index 1/25/18: Right Conclusion: The right JUDY is mildly reduced. Waveforms are consistent with tib-peroneal disease. Left Conclusion: The left JUDY is moderately reduced. Waveforms are consistent with popliteal disease.  b. Abdominal aortogram with runoff to 2/16/2018: Successful atherectomy and drug eluting balloon treatment to the left common femoral artery and the mid left SFA. Residual right SFA disease, asymptomatic at this time.  c. Doppler ankle brachial index for 4/11/2018:  Baseline right 1.04, left 0.96.  Post  exercise right 0.77, left 0.51  d. Abdominal aorticogram 6/26/2018: Successful atherectomy of the mid right SFA using Hawk 1 atherectomy device followed by an Inpact paclitaxel drug-eluting balloon.  3. Essential Hypertension.  4. Dyslipidemia.  5. Remote smoking tobacco abuse.  Chronic snuff use.  6. Family history of premature coronary artery disease.  7. Tic douloureux.  8. Ankle fracture, December 2015.  9. Peripheral neuropathy  10. Surgical history.  a.  Hemorrhoidectomy.  b. Vasectomy.  c. Tonsillectomy.  d. Tic douloureux neuralgia surgery.  e. Discectomy.    No Known Allergies    Current Medications:      Current Outpatient Prescriptions:   •  aspirin 81 MG tablet, Take 81 mg by mouth Daily., Disp: , Rfl:   •  clopidogrel (PLAVIX) 75 MG tablet, TAKE 1 TABLET BY MOUTH ONCE DAILY, Disp: 90 tablet, Rfl: 3  •  nitroglycerin (NITROLINGUAL) 0.4 MG/SPRAY spray, Place 1 spray under the tongue Every 5 (Five) Minutes As Needed for Chest Pain. PT. STATED THAT HE HAS NEVER HAD TO TAKE THIS., Disp: , Rfl:   •  rosuvastatin (CRESTOR) 20 MG tablet, Take 1 tablet by mouth Daily., Disp: 90 tablet, Rfl: 1  •  Multiple Vitamins-Minerals (MULTIVITAMIN ADULTS 50+ PO), Take 1 tablet by mouth Every Morning., Disp: , Rfl:     HPI    Damien Sorto presents today for 8 week follow up of ischemic heart disease, hypertension, dyslipidemia. Since last visit, patient has been doing well from a cardiac standpoint. He reports that his leg pain is significantly better, but continues to experience tingling and numbness which occurs with prolonged walking, but is still present when he is sitting down. Denies smoking cigarettes. Patient denies chest pain, palpitations, shortness of breath, edema, PND, orthopnea, dizziness, and syncope. Remains busy and active with walking a mile limited by back pain.    The following portions of the patient's history were reviewed and updated as appropriate: allergies, current medications and problem  "list.    Pertinent positives as listed in the HPI.  All other systems reviewed are negative.    Vitals:    08/29/18 1042   BP: 106/70   BP Location: Right arm   Patient Position: Sitting   Pulse: 73   Weight: 78.7 kg (173 lb 9.6 oz)   Height: 175.3 cm (69\")       Physical Exam:  General: Alert and oriented to person, place, and time.  Neck: Jugular venous pressure is within normal limits. Carotids have normal upstrokes without bruits.   Cardiovascular: Regular rate and rhythm without murmur gallop or rub.  Lungs: Clear without rales or wheezes. Equal expansion is noted.   Extremities: Show no edema.  Skin: warm and dry.  Neurologic: nonfocal    Diagnostic Data:    Lab Results   Component Value Date    CHOL 105 06/26/2018    TRIG 133 06/26/2018    HDL 29 (L) 06/26/2018    LDL 61 06/26/2018     Lab Results   Component Value Date    GLUCOSE 116 (H) 06/25/2018    BUN 25 (H) 06/25/2018    CREATININE 1.05 06/25/2018    EGFRIFNONA 72 06/25/2018    BCR 23.8 06/25/2018    K 4.7 06/25/2018    CO2 28.0 06/25/2018    CALCIUM 8.7 06/25/2018    ALBUMIN 4.27 06/25/2018    AST 27 06/25/2018    ALT 23 06/25/2018     Lab Results   Component Value Date    WBC 4.07 06/25/2018    HGB 11.9 (L) 06/25/2018    HCT 34.5 (L) 06/25/2018    .1 (H) 06/25/2018     06/25/2018         Procedures    Assessment:      ICD-10-CM ICD-9-CM   1. PVD (peripheral vascular disease) (CMS/[facility]) I73.9 443.9   2. IHD (ischemic heart disease) I25.9 414.9   3. Essential hypertension I10 401.9   4. Dyslipidemia E78.5 272.4       Plan:    1. Begin regular exercise regimen for at least 30 minutes, 4-5 days a week.  2. DC Plavix when prescription runs out.   3. Continue current medications.  4. F/up in 6 months or sooner if needed with JUDY at that time    Scribed for Yola Gleason MD by Tolu Medrano. 8/29/2018  10:55 AM     I Yola Gleason MD personally performed the services described in this documentation as scribed by the above " individual in my presence, and it is both accurate and complete.    Yola Gleason MD, FACC

## 2018-08-30 RX ORDER — ROSUVASTATIN CALCIUM 20 MG/1
TABLET, COATED ORAL
Qty: 90 TABLET | Refills: 1 | Status: SHIPPED | OUTPATIENT
Start: 2018-08-30 | End: 2019-04-09 | Stop reason: SDUPTHER

## 2019-03-13 ENCOUNTER — HOSPITAL ENCOUNTER (OUTPATIENT)
Dept: CARDIOLOGY | Facility: HOSPITAL | Age: 64
End: 2019-03-13
Attending: INTERNAL MEDICINE

## 2019-03-14 ENCOUNTER — OFFICE VISIT (OUTPATIENT)
Dept: CARDIOLOGY | Facility: CLINIC | Age: 64
End: 2019-03-14

## 2019-03-14 ENCOUNTER — HOSPITAL ENCOUNTER (OUTPATIENT)
Dept: CARDIOLOGY | Facility: HOSPITAL | Age: 64
Discharge: HOME | End: 2019-03-14
Attending: INTERNAL MEDICINE | Admitting: INTERNAL MEDICINE

## 2019-03-14 VITALS
HEART RATE: 69 BPM | SYSTOLIC BLOOD PRESSURE: 104 MMHG | BODY MASS INDEX: 27.11 KG/M2 | WEIGHT: 183 LBS | HEIGHT: 69 IN | DIASTOLIC BLOOD PRESSURE: 64 MMHG

## 2019-03-14 VITALS — WEIGHT: 173 LBS | BODY MASS INDEX: 25.62 KG/M2 | HEIGHT: 69 IN

## 2019-03-14 DIAGNOSIS — E78.5 DYSLIPIDEMIA: ICD-10-CM

## 2019-03-14 DIAGNOSIS — I25.10 CORONARY ARTERY DISEASE INVOLVING NATIVE CORONARY ARTERY OF NATIVE HEART WITHOUT ANGINA PECTORIS: Primary | ICD-10-CM

## 2019-03-14 DIAGNOSIS — I73.9 PVD (PERIPHERAL VASCULAR DISEASE) (HCC): ICD-10-CM

## 2019-03-14 DIAGNOSIS — I10 ESSENTIAL HYPERTENSION: ICD-10-CM

## 2019-03-14 LAB
BH CV LOWER ARTERIAL LEFT ABI RATIO: 0.72
BH CV LOWER ARTERIAL LEFT DORSALIS PEDIS SYS MAX: 91 MMHG
BH CV LOWER ARTERIAL LEFT GREAT TOE SYS MAX: 51 MMHG
BH CV LOWER ARTERIAL LEFT POST EX ABI RATIO: 0.46
BH CV LOWER ARTERIAL LEFT POST TIBIAL SYS MAX: 91 MMHG
BH CV LOWER ARTERIAL LEFT TBI RATIO: 0.4
BH CV LOWER ARTERIAL RIGHT ABI RATIO: 1.09
BH CV LOWER ARTERIAL RIGHT DORSALIS PEDIS SYS MAX: 138 MMHG
BH CV LOWER ARTERIAL RIGHT GREAT TOE SYS MAX: 88 MMHG
BH CV LOWER ARTERIAL RIGHT POST EX ABI RATIO: 0.85
BH CV LOWER ARTERIAL RIGHT POST TIBIAL SYS MAX: 131 MMHG
BH CV LOWER ARTERIAL RIGHT TBI RATIO: 0.69
UPPER ARTERIAL LEFT ARM BRACHIAL SYS MAX: 124 MMHG
UPPER ARTERIAL RIGHT ARM BRACHIAL SYS MAX: 127 MMHG

## 2019-03-14 PROCEDURE — 99214 OFFICE O/P EST MOD 30 MIN: CPT | Performed by: INTERNAL MEDICINE

## 2019-03-14 PROCEDURE — 93924 LWR XTR VASC STDY BILAT: CPT | Performed by: INTERNAL MEDICINE

## 2019-03-14 PROCEDURE — 93924 LWR XTR VASC STDY BILAT: CPT

## 2019-03-14 RX ORDER — RIVAROXABAN 2.5 MG/1
2.5 TABLET, FILM COATED ORAL 2 TIMES DAILY
COMMUNITY
Start: 2018-12-19

## 2019-03-14 NOTE — PROGRESS NOTES
Damien Sorto  1955  63 y.o.  [phone]  [phone]      Date: 03/14/2019    PCP: Titi Chan MD    Chief Complaint   Patient presents with   • PVD (peripheral vascular disease) (CMS/[facility])       Problem List:  1. Ischemic heart disease:  a. History of acute inferior MI with symptoms of shortness of breath and severe chest  pain, treated with Streptokinase/PTCA and stent placement to the RCA in February 1998.  b. [facility], 02/17/2000: 40% LAD stenosis with an EF > 60%.  c. Acute inferior MI, 09/14/2007, with placement of 1 stent to the RCA at [facility]; data deficient.  d. [facility], 01/08/2009: Nonobstructive disease which was diffusely present throughout. Patent RCA stent. EF 55%.  e. GXT, 12/12/2017 at [place]: Exercise duration 5 minutes with achievement of 97% of expected maximal HR. No evidence of ischemia. Stress test was stopped secondary to leg pain and cramps. No dyspnea, diaphoresis, or chest pain.  f. Echocardiogram for near syncopal episode, 06/03/2018: EF > 60%, no regional wall motion abnormalities.  2. Peripheral vascular disease:  a. Doppler JUDY, 01/25/2018: Right JUDY is mildly reduced. Waveforms are c/w tib-peroneal disease. Left JUDY is moderately reduced. Waveforms are c/w popliteal disease.  b. Abdominal aortogram with runoff, 02/16/2018: Successful atherectomy and drug eluting balloon treatment to the left common femoral artery and the mid left SFA. Residual right SFA disease, asymptomatic at this time.  c. Doppler JUDY, 04/11/2018: Baseline right 1.04, left 0.96. Post exercise right 0.77, left 0.51  d. Abdominal aortogram, 06/26/2018: Successful atherectomy of the mid right SFA using Hawk 1 atherectomy device followed by an Inpact paclitaxel drug-eluting balloon.  e. JUDY 3/14/2019: Right JUDY normal at rest and with exercise.  Left JUDY 0.72 at rest and 0.46 following exercise with associated symptoms of claudication.  3. Hypertension.  4. Dyslipidemia.  5. Remote smoking tobacco abuse.  "Chronic snuff use.  6. Family history of premature coronary artery disease.  7. Tic douloureux.  8. Ankle fracture, December 2015.  9. Peripheral neuropathy  10. Surgical history.  a.  Hemorrhoidectomy.  b. Vasectomy.  c. Tonsillectomy.  d. Tic douloureux neuralgia surgery.  e. Discectomy.    No Known Allergies    Current Medications:      Current Outpatient Medications:   •  aspirin 81 MG tablet, Take 81 mg by mouth Daily., Disp: , Rfl:   •  Multiple Vitamins-Minerals (MULTIVITAMIN ADULTS 50+ PO), Take 1 tablet by mouth Every Morning., Disp: , Rfl:   •  nitroglycerin (NITROLINGUAL) 0.4 MG/SPRAY spray, Place 1 spray under the tongue Every 5 (Five) Minutes As Needed for Chest Pain. PT. STATED THAT HE HAS NEVER HAD TO TAKE THIS., Disp: , Rfl:   •  rosuvastatin (CRESTOR) 20 MG tablet, TAKE ONE TABLET BY MOUTH ONCE DAILY, Disp: 90 tablet, Rfl: 1  •  XARELTO 2.5 MG tablet, Take 2.5 mg by mouth Daily., Disp: , Rfl:     HPI    Damien Sorto is a 63 y.o. male who presents today for six month follow up of coronary artery disease, peripheral vascular disease, hypertension, and hyperlipidemia. Since last visit, he has still been experiencing severe pain in his legs, especially on the left one. This is made worse by walking, especially at an incline or up stairs, and is made better by rest. He underwent atherectomy and drug-eluting balloon treatment in both legs in 2018. He states that he felt good for \"about a month\" after this, and then the pain came back. Patient denies chest pain, palpitations, shortness of breath, dizziness, and syncope.     The following portions of the patient's history were reviewed and updated as appropriate: allergies, current medications and problem list.    Pertinent positives as listed in the HPI.  All other systems reviewed are negative.    Vitals:    03/14/19 1257   BP: 104/64   BP Location: Right arm   Patient Position: Sitting   Pulse: 69   Weight: 83 kg (183 lb)   Height: 175.3 cm (69\") "       Physical Exam:    General: Alert and oriented.  Neck: Jugular venous pressure is within normal limits. Carotids have normal upstrokes without bruits.   Cardiovascular: Heart has a nondisplaced focal PMI. Regular rate and rhythm without murmur, gallop or rub.  Lungs: Clear without rales or wheezes. Equal expansion is noted.   Extremities: Show no edema.  The pulses in the left foot are palpable but are reduced compared with the right.  Skin: Warm and dry.  Neurologic: Nonfocal.    Diagnostic Data:  Lab Results   Component Value Date    GLUCOSE 116 (H) 06/25/2018    BUN 25 (H) 06/25/2018    CREATININE 1.05 06/25/2018    EGFRIFNONA 72 06/25/2018    BCR 23.8 06/25/2018     06/25/2018    K 4.7 06/25/2018     06/25/2018    CO2 28.0 06/25/2018    CALCIUM 8.7 06/25/2018    ALBUMIN 4.27 06/25/2018    AST 27 06/25/2018    ALT 23 06/25/2018     Lab Results   Component Value Date    CHOL 105 06/26/2018    TRIG 133 06/26/2018    HDL 29 (L) 06/26/2018    LDL 61 06/26/2018      Lab Results   Component Value Date    WBC 4.07 06/25/2018    HGB 11.9 (L) 06/25/2018    HCT 34.5 (L) 06/25/2018    .1 (H) 06/25/2018     06/25/2018     Lab Results   Component Value Date    HGBA1C 6.00 (H) 06/25/2018      Procedures    Assessment:      ICD-10-CM ICD-9-CM   1. Coronary artery disease involving native coronary artery of native heart without angina pectoris I25.10 414.01   2. PVD (peripheral vascular disease) (CMS/[facility]) I73.9 443.9   3. Essential hypertension I10 401.9   4. Dyslipidemia E78.5 272.4       Plan:    1. Continue aspirin 81 mg for CAD and PVD.  2. Investigate additional options for lower extremity revascularization as the Silver Hawk and subsequent drug coated balloon angioplasty were not effective.  3. Continue nitroglycerin prn for angina.  4. Continue rosuvastatin 20 mg for dyslipidemia.  5. Continue all other current medications.  6. F/up in 6 months or sooner if needed.    Scribed for Yola PERALTA  MD Rosibel by Nalini López. 3/14/2019  1:03 PM    I Yola Gleason MD personally performed the services described in this documentation as scribed by the above individual in my presence, and it is both accurate and complete.    Yola Gleason MD, FACC

## 2019-03-19 ENCOUNTER — DOCUMENTATION (OUTPATIENT)
Dept: CARDIOLOGY | Facility: CLINIC | Age: 64
End: 2019-03-19

## 2019-04-09 RX ORDER — ROSUVASTATIN CALCIUM 20 MG/1
20 TABLET, COATED ORAL DAILY
Qty: 90 TABLET | Refills: 0 | Status: SHIPPED | OUTPATIENT
Start: 2019-04-09 | End: 2020-01-10

## 2019-07-09 RX ORDER — ROSUVASTATIN CALCIUM 20 MG/1
TABLET, COATED ORAL
Qty: 90 TABLET | Refills: 0 | Status: SHIPPED | OUTPATIENT
Start: 2019-07-09 | End: 2019-10-07 | Stop reason: SDUPTHER

## 2019-10-07 DIAGNOSIS — E78.5 DYSLIPIDEMIA: Primary | ICD-10-CM

## 2019-10-07 RX ORDER — ROSUVASTATIN CALCIUM 20 MG/1
20 TABLET, COATED ORAL DAILY
Qty: 90 TABLET | Refills: 0 | Status: SHIPPED | OUTPATIENT
Start: 2019-10-07 | End: 2019-10-09 | Stop reason: SDUPTHER

## 2019-10-09 ENCOUNTER — OFFICE VISIT (OUTPATIENT)
Dept: CARDIOLOGY | Facility: CLINIC | Age: 64
End: 2019-10-09

## 2019-10-09 VITALS
OXYGEN SATURATION: 98 % | WEIGHT: 186 LBS | HEIGHT: 69 IN | BODY MASS INDEX: 27.55 KG/M2 | HEART RATE: 71 BPM | SYSTOLIC BLOOD PRESSURE: 98 MMHG | DIASTOLIC BLOOD PRESSURE: 60 MMHG

## 2019-10-09 DIAGNOSIS — I25.10 CORONARY ARTERY DISEASE INVOLVING NATIVE CORONARY ARTERY OF NATIVE HEART WITHOUT ANGINA PECTORIS: Primary | ICD-10-CM

## 2019-10-09 DIAGNOSIS — I10 ESSENTIAL HYPERTENSION: ICD-10-CM

## 2019-10-09 DIAGNOSIS — I73.9 PVD (PERIPHERAL VASCULAR DISEASE) (HCC): ICD-10-CM

## 2019-10-09 PROCEDURE — 99214 OFFICE O/P EST MOD 30 MIN: CPT | Performed by: INTERNAL MEDICINE

## 2019-10-09 NOTE — PROGRESS NOTES
Damien Sorto  1955  64 y.o.  [phone]  [phone]    Date: 10/09/2019    PCP: Tiit Chan MD    Chief Complaint   Patient presents with   • Coronary Artery Disease     Problem List:    1. CAD  a. History of acute inferior myocardial infarction with symptoms of shortness of breath and severe chest  pain, treated with Streptokinase/PTCA and stent placement to the RCA in February 1998.  b. Left heart catheterization, 02/17/2000, revealing 40% LAD stenosis with an ejection fraction of greater than 60%.  c. Acute inferior myocardial infarction, 09/14/2007,  with placement of 1 stent to the RCA at [facility]; data deficient.  d. Left heart catheterization, 01/08/2009, revealing nonobstructive disease which was diffusely present throughout.  Patent right coronary stent.  LVEF of  55%.  e. GXT December 12, 2017 at [place]: Exercise duration 5 minutes with the achievement of 97% of expected maximal heart rate.  No evidence of ischemia by clinical or echocardiographic criteria.  The stress test was stopped secondary to leg pain and cramps.  There is no dyspnea or diaphoresis or chest pain.  f. Near syncopal episode Angella 3, 2018.  Essentially normal cardiac workup with echocardiogram showing EF greater than 60%, no regional wall motion abnormalities.  Images were reviewed by Dr. Gleason who concurred with the findings.  2. Peripheral artery disease:  a. Doppler ankle brachial index 1/25/18: Right Conclusion: The right JUDY is mildly reduced. Waveforms are consistent with tib-peroneal disease. Left Conclusion: The left JUDY is moderately reduced. Waveforms are consistent with popliteal disease.  b. Abdominal aortogram with runoff to 2/16/2018: Successful atherectomy and drug eluting balloon treatment to the left common femoral artery and the mid left SFA. Residual right SFA disease, asymptomatic at this time.  c. Doppler ankle brachial index for 4/11/2018:  Baseline right  1.04, left 0.96.  Post exercise right 0.77, left 0.51  d. Abdominal aorticogram 6/26/2018: Successful atherectomy of the mid right SFA using Hawk 1 atherectomy device followed by an Inpact paclitaxel drug-eluting balloon.  e. Stress JUDY 03/14/2019: Left JUDY 0.72 at rest, 0.46 with exercise. Normal right JUDY  3. Essential Hypertension.   4. Dyslipidemia.  5. Remote smoking tobacco abuse.  Chronic snuff use.  6. Family history of premature coronary artery disease.  7. Tic douloureux.  8. Ankle fracture, December 2015.  9. Peripheral neuropathy  10. Surgical history.  a.  Hemorrhoidectomy.  b. Vasectomy.  c. Tonsillectomy.  d. Tic douloureux neuralgia surgery.  e. Discectomy      No Known Allergies    Current Medications:      Current Outpatient Medications:   •  aspirin 81 MG tablet, Take 81 mg by mouth Daily., Disp: , Rfl:   •  Multiple Vitamins-Minerals (MULTIVITAMIN ADULTS 50+ PO), Take 1 tablet by mouth Every Morning., Disp: , Rfl:   •  nitroglycerin (NITROLINGUAL) 0.4 MG/SPRAY spray, Place 1 spray under the tongue Every 5 (Five) Minutes As Needed for Chest Pain. PT. STATED THAT HE HAS NEVER HAD TO TAKE THIS., Disp: , Rfl:   •  rosuvastatin (CRESTOR) 20 MG tablet, Take 1 tablet by mouth Daily., Disp: 90 tablet, Rfl: 0  •  XARELTO 2.5 MG tablet, Take 2.5 mg by mouth 2 (Two) Times a Day., Disp: , Rfl:     HPI    Damien Sorto is a 64 y.o. male who presents today for a six month follow up of coronary artery disease, peripheral artery disease, hypertension, and dyslipidemia. Since last visit, he continues to have claudication and numbness/decreased feeling in hands and feet. He was followed by neurology and told that he does not have neuropathy. He was also seen by Dr. Clifford for PAD and felt that his symptoms were mixed and recommended an exercise regimen. He state that he is due to [facility] physical and they will require an updated stress test. He has been having his stress tests performed in [place].  Patient  "denies chest pain, palpitations, shortness of breath, edema, dizziness, and syncope. His BP is well controlled.     The following portions of the patient's history were reviewed and updated as appropriate: allergies, current medications and problem list.    Pertinent positives as listed in the HPI.  All other systems reviewed are negative.    Vitals:    10/09/19 1311   BP: 98/60   BP Location: Right arm   Patient Position: Sitting   Pulse: 71   SpO2: 98%   Weight: 84.4 kg (186 lb)   Height: 175.3 cm (69\")       Physical Exam:    General: Alert and oriented.  Neck: Jugular venous pressure is within normal limits. Carotids have normal upstrokes without bruits.   Cardiovascular: Heart has a nondisplaced focal PMI. Regular rate and rhythm without murmur, gallop or rub.  Lungs: Clear without rales or wheezes. Equal expansion is noted.   Extremities: Show no edema.  Skin: Warm and dry.  Neurologic: Nonfocal.    Diagnostic Data:    Lab Results   Component Value Date    CHOL 105 06/26/2018    TRIG 133 06/26/2018    HDL 29 (L) 06/26/2018    LDL 61 06/26/2018        Procedures    Assessment:      ICD-10-CM ICD-9-CM   1. Coronary artery disease involving native coronary artery of native heart without angina pectoris I25.10 414.01   2. PVD (peripheral vascular disease) (CMS/[facility]) I73.9 443.9   3. Essential hypertension I10 401.9     Lab results found above were reviewed with the patient.    Plan:    1. Continue ASA, statin for CAD.  2. Will plan for chemical nuclear stress test to evaluate for ischemia. Unable to walk due to claudication.   3. Continue ASA, statin, Xarelto for PAD.  4. Continue all other current medications.  5. Refer to Dr. Guerrero for PAD.  6. F/u in 12 months or sooner if needed.      Scribed for Yola Gleason MD by CHERYL Corral, JAI. 10/9/2019  1:39 PM     I Yola Gleason MD personally performed the services described in this documentation as scribed by the above individual in my " presence, and it is both accurate and complete.    Yola Gleason MD, FACC

## 2019-11-06 ENCOUNTER — APPOINTMENT (OUTPATIENT)
Dept: CARDIOLOGY | Facility: HOSPITAL | Age: 64
End: 2019-11-06

## 2019-12-19 DIAGNOSIS — R79.89 ELEVATED SERUM CREATININE: Primary | ICD-10-CM

## 2020-01-10 RX ORDER — ROSUVASTATIN CALCIUM 20 MG/1
TABLET, COATED ORAL
Qty: 90 TABLET | Refills: 3 | Status: SHIPPED | OUTPATIENT
Start: 2020-01-10 | End: 2021-02-08 | Stop reason: SDUPTHER

## 2020-02-12 ENCOUNTER — HOSPITAL ENCOUNTER (OUTPATIENT)
Dept: CARDIOLOGY | Facility: HOSPITAL | Age: 65
Discharge: HOME | End: 2020-02-12

## 2020-02-12 ENCOUNTER — HOSPITAL ENCOUNTER (OUTPATIENT)
Dept: CARDIOLOGY | Facility: HOSPITAL | Age: 65
Discharge: HOME | End: 2020-02-12
Admitting: INTERNAL MEDICINE

## 2020-02-12 DIAGNOSIS — I25.10 CORONARY ARTERY DISEASE INVOLVING NATIVE CORONARY ARTERY OF NATIVE HEART WITHOUT ANGINA PECTORIS: ICD-10-CM

## 2020-02-12 LAB
BH CV STRESS BP STAGE 2: NORMAL
BH CV STRESS BP STAGE 4: NORMAL
BH CV STRESS COMMENTS STAGE 1: NORMAL
BH CV STRESS DOSE REGADENOSON STAGE 1: 0.4
BH CV STRESS DURATION MIN STAGE 1: 1
BH CV STRESS DURATION MIN STAGE 2: 1
BH CV STRESS DURATION MIN STAGE 3: 1
BH CV STRESS DURATION MIN STAGE 4: 1
BH CV STRESS DURATION SEC STAGE 1: 0
BH CV STRESS DURATION SEC STAGE 2: 0
BH CV STRESS DURATION SEC STAGE 3: 0
BH CV STRESS DURATION SEC STAGE 4: 0
BH CV STRESS HR STAGE 1: 80
BH CV STRESS HR STAGE 2: 104
BH CV STRESS HR STAGE 3: 95
BH CV STRESS HR STAGE 4: 88
BH CV STRESS PROTOCOL 1: NORMAL
BH CV STRESS RECOVERY BP: NORMAL MMHG
BH CV STRESS RECOVERY HR: 75 BPM
BH CV STRESS STAGE 1: 1
BH CV STRESS STAGE 2: 2
BH CV STRESS STAGE 3: 3
BH CV STRESS STAGE 4: 4
LV EF NUC BP: 55 %
MAXIMAL PREDICTED HEART RATE: 158 BPM
PERCENT MAX PREDICTED HR: 65.82 %
STRESS BASELINE BP: NORMAL MMHG
STRESS BASELINE HR: 65 BPM
STRESS PERCENT HR: 77 %
STRESS POST ESTIMATED WORKLOAD: 1 METS
STRESS POST EXERCISE DUR MIN: 4 MIN
STRESS POST EXERCISE DUR SEC: 0 SEC
STRESS POST PEAK BP: NORMAL MMHG
STRESS POST PEAK HR: 104 BPM
STRESS TARGET HR: 134 BPM

## 2020-02-12 PROCEDURE — 93017 CV STRESS TEST TRACING ONLY: CPT

## 2020-02-12 PROCEDURE — 25010000002 REGADENOSON 0.4 MG/5ML SOLUTION: Performed by: INTERNAL MEDICINE

## 2020-02-12 PROCEDURE — A9500 TC99M SESTAMIBI: HCPCS | Performed by: INTERNAL MEDICINE

## 2020-02-12 PROCEDURE — 0 TECHNETIUM SESTAMIBI: Performed by: INTERNAL MEDICINE

## 2020-02-12 PROCEDURE — 78452 HT MUSCLE IMAGE SPECT MULT: CPT | Performed by: INTERNAL MEDICINE

## 2020-02-12 PROCEDURE — 93018 CV STRESS TEST I&R ONLY: CPT | Performed by: INTERNAL MEDICINE

## 2020-02-12 PROCEDURE — 78452 HT MUSCLE IMAGE SPECT MULT: CPT

## 2020-02-12 RX ADMIN — TECHNETIUM TC 99M SESTAMIBI 1 DOSE: 1 INJECTION INTRAVENOUS at 09:45

## 2020-02-12 RX ADMIN — TECHNETIUM TC 99M SESTAMIBI 1 DOSE: 1 INJECTION INTRAVENOUS at 07:50

## 2020-02-12 RX ADMIN — REGADENOSON 0.4 MG: 0.08 INJECTION, SOLUTION INTRAVENOUS at 09:43

## 2020-07-13 ENCOUNTER — HOSPITAL ENCOUNTER (OUTPATIENT)
Age: 65
End: 2020-07-13
Payer: COMMERCIAL

## 2020-07-13 DIAGNOSIS — M54.5: Primary | ICD-10-CM

## 2020-07-13 PROCEDURE — 72110 X-RAY EXAM L-2 SPINE 4/>VWS: CPT

## 2021-02-08 RX ORDER — ROSUVASTATIN CALCIUM 20 MG/1
20 TABLET, COATED ORAL DAILY
Qty: 90 TABLET | Refills: 0 | Status: SHIPPED | OUTPATIENT
Start: 2021-02-08 | End: 2021-05-10

## 2021-03-22 ENCOUNTER — OFFICE VISIT (OUTPATIENT)
Dept: CARDIOLOGY | Facility: CLINIC | Age: 66
End: 2021-03-22

## 2021-03-22 VITALS
BODY MASS INDEX: 26.34 KG/M2 | WEIGHT: 184 LBS | DIASTOLIC BLOOD PRESSURE: 76 MMHG | SYSTOLIC BLOOD PRESSURE: 124 MMHG | HEIGHT: 70 IN | HEART RATE: 74 BPM

## 2021-03-22 DIAGNOSIS — I10 ESSENTIAL HYPERTENSION: ICD-10-CM

## 2021-03-22 DIAGNOSIS — E78.5 DYSLIPIDEMIA: ICD-10-CM

## 2021-03-22 DIAGNOSIS — I73.9 PVD (PERIPHERAL VASCULAR DISEASE) (HCC): ICD-10-CM

## 2021-03-22 DIAGNOSIS — I25.10 CORONARY ARTERY DISEASE INVOLVING NATIVE CORONARY ARTERY OF NATIVE HEART WITHOUT ANGINA PECTORIS: Primary | ICD-10-CM

## 2021-03-22 PROCEDURE — 99213 OFFICE O/P EST LOW 20 MIN: CPT | Performed by: INTERNAL MEDICINE

## 2021-03-22 NOTE — PROGRESS NOTES
[facility] Cardiology    Patient ID: Damien Sorto is a 65 y.o. male.  : 1955   Contact: [phone]    Encounter date: 2021    PCP: Titi Chan MD      Chief complaint:   Chief Complaint   Patient presents with   • Coronary artery disease involving native coronary artery of      Problem List:  1. CAD  a. History of acute inferior myocardial infarction with symptoms of shortness of breath and severe chest  pain, treated with Streptokinase/PTCA and stent placement to the RCA in 1998.  b. Left heart catheterization, 2000, revealing 40% LAD stenosis with an ejection fraction of greater than 60%.  c. Acute inferior myocardial infarction, 2007,  with placement of 1 stent to the RCA at [facility]; data deficient.  d. Left heart catheterization, 2009, revealing nonobstructive disease which was diffusely present throughout.  Patent right coronary stent.  LVEF of  55%.  e. GXT 2017 at [place]: Exercise duration 5 minutes with the achievement of 97% of expected maximal heart rate.  No evidence of ischemia by clinical or echocardiographic criteria.  The stress test was stopped secondary to leg pain and cramps.  There is no dyspnea or diaphoresis or chest pain.  f. Near syncopal episode Angella 3, 2018. Essentially normal cardiac workup with echocardiogram showing EF greater than 60%, no regional wall motion abnormalities.  Images were reviewed by Dr. Gleason who concurred with the findings.  g. MPS, 2020: 3/10 chest pressure which resolved in recovery. No ST segment shift from baseline. No evidence of inducible ischemia by scintigraphic criteria. Low risk study for future cardiac events.  2. Peripheral artery disease:  a. Doppler ankle brachial index 18: Right Conclusion: The right JUDY is mildly reduced. Waveforms are consistent with tib-peroneal disease. Left Conclusion: The left JUDY is  moderately reduced. Waveforms are consistent with popliteal disease.  b. Abdominal aortogram with runoff to 2/16/2018: Successful atherectomy and drug eluting balloon treatment to the left common femoral artery and the mid left SFA. Residual right SFA disease, asymptomatic at this time.  c. Doppler ankle brachial index for 4/11/2018:  Baseline right 1.04, left 0.96.  Post exercise right 0.77, left 0.51  d. Abdominal aorticogram 6/26/2018: Successful atherectomy of the mid right SFA using Hawk 1 atherectomy device followed by an Inpact paclitaxel drug-eluting balloon.  e. Stress JUDY 03/14/2019: Left JUDY 0.72 at rest, 0.46 with exercise. Normal right JUDY  3. Essential Hypertension.   4. Dyslipidemia.  5. Remote smoking tobacco abuse.  Chronic snuff use.  6. Family history of premature coronary artery disease.  7. Tic douloureux.  8. Ankle fracture, December 2015.  9. Peripheral neuropathy  10. Surgical history.  a.  Hemorrhoidectomy.  b. Vasectomy.  c. Tonsillectomy.  d. Tic douloureux neuralgia surgery.  e. Discectomy    No Known Allergies    Current Medications:    Current Outpatient Medications:   •  aspirin 81 MG tablet, Take 81 mg by mouth Daily., Disp: , Rfl:   •  Multiple Vitamins-Minerals (MULTIVITAMIN ADULTS 50+ PO), Take 1 tablet by mouth Every Morning., Disp: , Rfl:   •  nitroglycerin (NITROLINGUAL) 0.4 MG/SPRAY spray, Place 1 spray under the tongue Every 5 (Five) Minutes As Needed for Chest Pain. PT. STATED THAT HE HAS NEVER HAD TO TAKE THIS., Disp: , Rfl:   •  rosuvastatin (CRESTOR) 20 MG tablet, Take 1 tablet by mouth Daily., Disp: 90 tablet, Rfl: 0  •  XARELTO 2.5 MG tablet, Take 2.5 mg by mouth 2 (Two) Times a Day., Disp: , Rfl:     HPI    Damien Sorto is a 65 y.o. male who presents today for a follow up of coronary artery disease, PVD, and cardiac risk factors. Since last visit, patient has done well overall from a cardiovascular standpoint. Some claudication. Unchanged. Has not been as active. No  "chest pain, SOB, PND, orthopnea, ART.     The following portions of the patient's history were reviewed and updated as appropriate: allergies, current medications and problem list.    Pertinent positives as listed in the HPI.  All other systems reviewed are negative.         Vitals:    03/22/21 1335   BP: 124/76   BP Location: Right arm   Patient Position: Sitting   Pulse: 74   Weight: 83.5 kg (184 lb)   Height: 177.8 cm (70\")       Physical Exam:  General: Alert and oriented.  Neck: Jugular venous pressure is within normal limits. Carotids have normal upstrokes without bruits.   Cardiovascular: Heart has a nondisplaced focal PMI. Regular rate and rhythm. No murmur, gallop or rub.  Lungs: Clear, no rales or wheezes. Equal expansion is noted.   Extremities: Show no edema.  Skin: Warm and dry.  Neurologic: Nonfocal.     Diagnostic Data:    No recent laboratory studies available for review today.     Procedures      Assessment:    ICD-10-CM ICD-9-CM   1. Coronary artery disease involving native coronary artery of native heart without angina pectoris  I25.10 414.01   2. PVD (peripheral vascular disease) (CMS/[facility])  I73.9 443.9   3. Essential hypertension  I10 401.9   4. Dyslipidemia  E78.5 272.4         Plan:  1. Continue ASA, statin for CAD, PVD  2. Continue statin for HLD.   3. Patient was counseled to begin aerobic exercise 30 min per day for at least 4 days per week.   4. Check FLP, CMP  5. Continue all other current medications.  6. F/up in 12 months, sooner if needed.      Electronically signed by JAI Ferraro, 03/22/21, 1:49 PM EDT.              "

## 2021-05-07 RX ORDER — ROSUVASTATIN CALCIUM 20 MG/1
TABLET, COATED ORAL
Qty: 90 TABLET | Refills: 0 | Status: CANCELLED | OUTPATIENT
Start: 2021-05-07

## 2021-05-10 RX ORDER — ROSUVASTATIN CALCIUM 20 MG/1
20 TABLET, COATED ORAL DAILY
Qty: 30 TABLET | Refills: 0 | Status: SHIPPED | OUTPATIENT
Start: 2021-05-10 | End: 2021-06-15

## 2021-05-12 RX ORDER — ROSUVASTATIN CALCIUM 20 MG/1
TABLET, COATED ORAL
Qty: 90 TABLET | Refills: 0 | OUTPATIENT
Start: 2021-05-12

## 2021-06-03 ENCOUNTER — HOSPITAL ENCOUNTER (OUTPATIENT)
Age: 66
End: 2021-06-03
Payer: COMMERCIAL

## 2021-06-03 DIAGNOSIS — I25.10: Primary | ICD-10-CM

## 2021-06-03 LAB
ALBUMIN LEVEL: 4.3 G/DL (ref 3.5–5)
ALBUMIN/GLOB SERPL: 1.3 {RATIO} (ref 1.1–1.8)
ALP ISO SERPL-ACNC: 87 U/L (ref 38–126)
ALT SERPLBLD-CCNC: 21 U/L (ref 12–78)
ANION GAP SERPL CALC-SCNC: 12.4 MEQ/L (ref 5–15)
AST SERPL QL: 32 U/L (ref 17–59)
BILIRUBIN,TOTAL: 0.6 MG/DL (ref 0.2–1.3)
BUN SERPL-MCNC: 22 MG/DL (ref 9–20)
CALCIUM SPEC-MCNC: 8.8 MG/DL (ref 8.4–10.2)
CHLORIDE SPEC-SCNC: 104 MMOL/L (ref 98–107)
CHOLEST SPEC-SCNC: 110 MG/DL (ref 140–200)
CO2 SERPL-SCNC: 26 MMOL/L (ref 22–30)
CREAT BLD-SCNC: 1.2 MG/DL (ref 0.66–1.25)
ESTIMATED GLOMERULAR FILT RATE: 61 ML/MIN (ref 60–?)
GFR (AFRICAN AMERICAN): 74 ML/MIN (ref 60–?)
GLOBULIN SER CALC-MCNC: 3.4 G/DL (ref 1.3–3.2)
GLUCOSE: 126 MG/DL (ref 74–100)
HDLC SERPL-MCNC: 34 MG/DL (ref 40–60)
POTASSIUM: 4.4 MMOL/L (ref 3.5–5.1)
PROT SERPL-MCNC: 7.7 G/DL (ref 6.3–8.2)
SODIUM SPEC-SCNC: 138 MMOL/L (ref 136–145)
TRIGLYCERIDES: 166 MG/DL (ref 30–150)

## 2021-06-03 PROCEDURE — 80061 LIPID PANEL: CPT

## 2021-06-03 PROCEDURE — 36415 COLL VENOUS BLD VENIPUNCTURE: CPT

## 2021-06-03 PROCEDURE — 80053 COMPREHEN METABOLIC PANEL: CPT

## 2021-06-15 RX ORDER — ROSUVASTATIN CALCIUM 20 MG/1
TABLET, COATED ORAL
Qty: 30 TABLET | Refills: 0 | Status: SHIPPED | OUTPATIENT
Start: 2021-06-15 | End: 2021-07-22

## 2021-07-20 RX ORDER — ROSUVASTATIN CALCIUM 20 MG/1
TABLET, COATED ORAL
Qty: 30 TABLET | Refills: 0 | OUTPATIENT
Start: 2021-07-20

## 2021-07-21 DIAGNOSIS — I25.10 CORONARY ARTERY DISEASE INVOLVING NATIVE CORONARY ARTERY OF NATIVE HEART WITHOUT ANGINA PECTORIS: ICD-10-CM

## 2021-07-23 RX ORDER — ROSUVASTATIN CALCIUM 20 MG/1
TABLET, COATED ORAL
Qty: 90 TABLET | Refills: 2 | Status: SHIPPED | OUTPATIENT
Start: 2021-07-23 | End: 2022-05-24

## 2021-07-28 ENCOUNTER — TELEPHONE (OUTPATIENT)
Dept: CARDIOLOGY | Facility: CLINIC | Age: 66
End: 2021-07-28

## 2021-07-28 NOTE — TELEPHONE ENCOUNTER
----- Message from JAI Ferraro sent at 7/28/2021 10:07 AM EDT -----  Reviewed FLP, CMP. LDL is too high for him. Would increase Crestor to 40 mg daily. Repeat FLP, CMP in 3 months. Thanks!      Reviewed with Gabriela Callahan LDL 49, no change needed. Letter sent to PCP and pt. Spoke with pt.

## 2022-03-29 ENCOUNTER — OFFICE VISIT (OUTPATIENT)
Dept: CARDIOLOGY | Facility: CLINIC | Age: 67
End: 2022-03-29

## 2022-03-29 VITALS
OXYGEN SATURATION: 98 % | WEIGHT: 185 LBS | DIASTOLIC BLOOD PRESSURE: 66 MMHG | SYSTOLIC BLOOD PRESSURE: 108 MMHG | HEIGHT: 70 IN | BODY MASS INDEX: 26.48 KG/M2 | HEART RATE: 78 BPM

## 2022-03-29 DIAGNOSIS — I73.9 PVD (PERIPHERAL VASCULAR DISEASE): ICD-10-CM

## 2022-03-29 DIAGNOSIS — I25.10 CORONARY ARTERY DISEASE INVOLVING NATIVE CORONARY ARTERY OF NATIVE HEART WITHOUT ANGINA PECTORIS: Primary | ICD-10-CM

## 2022-03-29 DIAGNOSIS — E78.5 DYSLIPIDEMIA: ICD-10-CM

## 2022-03-29 DIAGNOSIS — I10 PRIMARY HYPERTENSION: ICD-10-CM

## 2022-03-29 PROCEDURE — 99213 OFFICE O/P EST LOW 20 MIN: CPT | Performed by: NURSE PRACTITIONER

## 2022-03-29 NOTE — PROGRESS NOTES
[facility] Cardiology    Patient ID: Damien Sorto is a 66 y.o. male.  : 1955   Contact: [phone]    Encounter date: 2022    PCP: Titi Chan MD      Chief complaint:   Chief Complaint   Patient presents with   • Coronary Artery Disease     PROBLEM LIST:  1. CAD  a. History of acute inferior myocardial infarction with symptoms of shortness of breath and severe chest  pain, treated with Streptokinase/PTCA and stent placement to the RCA in 1998.  b. Left heart catheterization, 2000, revealing 40% LAD stenosis with an ejection fraction of greater than 60%.  c. Acute inferior myocardial infarction, 2007,  with placement of 1 stent to the RCA at [facility]; data deficient.  d. Left heart catheterization, 2009, revealing nonobstructive disease which was diffusely present throughout.  Patent right coronary stent.  LVEF of  55%.  e. GXT 2017 at [place]: Exercise duration 5 minutes with the achievement of 97% of expected maximal heart rate.  No evidence of ischemia by clinical or echocardiographic criteria.  The stress test was stopped secondary to leg pain and cramps.  There is no dyspnea or diaphoresis or chest pain.  f. Near syncopal episode Angella 3, 2018. Essentially normal cardiac workup with echocardiogram showing EF greater than 60%, no regional wall motion abnormalities.  Images were reviewed by Dr. Gleason who concurred with the findings.  g. MPS, 2020: 3/10 chest pressure which resolved in recovery. No ST segment shift from baseline. No evidence of inducible ischemia by scintigraphic criteria. Low risk study for future cardiac events.  2. Peripheral artery disease:  a. Doppler ankle brachial index 18: Right Conclusion: The right JUDY is mildly reduced. Waveforms are consistent with tib-peroneal disease. Left Conclusion: The left JUDY is moderately reduced. Waveforms are  consistent with popliteal disease.  b. Abdominal aortogram with runoff to 2/16/2018: Successful atherectomy and drug eluting balloon treatment to the left common femoral artery and the mid left SFA. Residual right SFA disease, asymptomatic at this time.  c. Doppler ankle brachial index for 4/11/2018:  Baseline right 1.04, left 0.96.  Post exercise right 0.77, left 0.51  d. Abdominal aorticogram 6/26/2018: Successful atherectomy of the mid right SFA using Hawk 1 atherectomy device followed by an Inpact paclitaxel drug-eluting balloon.  e. Stress JUDY 03/14/2019: Left JUDY 0.72 at rest, 0.46 with exercise. Normal right JUDY.  3. Essential Hypertension.   4. Dyslipidemia.  5. Remote smoking tobacco abuse.  6. Family history of premature coronary artery disease.  7. Tic douloureux.  8. Ankle fracture, December 2015.  9. Peripheral neuropathy  10. Surgical history.  a.  Hemorrhoidectomy.  b. Vasectomy.  c. Tonsillectomy.  d. Tic douloureux neuralgia surgery.  e. Discectomy       Allergies and Medications:  No Known Allergies    Current Medications:    Current Outpatient Medications:   •  aspirin 81 MG tablet, Take 81 mg by mouth Daily., Disp: , Rfl:   •  Multiple Vitamins-Minerals (MULTIVITAMIN ADULTS 50+ PO), Take 1 tablet by mouth Every Morning., Disp: , Rfl:   •  nitroglycerin (NITROLINGUAL) 0.4 MG/SPRAY spray, Place 1 spray under the tongue Every 5 (Five) Minutes As Needed for Chest Pain. PT. STATED THAT HE HAS NEVER HAD TO TAKE THIS., Disp: , Rfl:   •  rosuvastatin (CRESTOR) 20 MG tablet, Take 1 tablet by mouth once daily, Disp: 90 tablet, Rfl: 2  •  XARELTO 2.5 MG tablet, Take 2.5 mg by mouth 2 (Two) Times a Day., Disp: , Rfl:     HPI    Damien Sorto is a 66 y.o. male who presents today for follow up on PVD, CAD, HTN. Since last visit, patient has done well overall. BP has been good. Denies any chest pain, SOA, PND, orthopnea, edema. Some claudication with ambulation.      The following portions of the patient's  "history were reviewed and updated as appropriate: allergies, current medications and problem list.    Pertinent positives as listed in the HPI.  All other systems reviewed are negative.         Vitals:    03/29/22 1325   BP: 108/66   BP Location: Left arm   Patient Position: Sitting   Pulse: 78   SpO2: 98%   Weight: 83.9 kg (185 lb)   Height: 177.8 cm (70\")       Physical Exam:  General: Alert and oriented.  Neck: Jugular venous pressure is within normal limits. Carotids have normal upstrokes without bruits.   Cardiovascular: Heart has a nondisplaced focal PMI. Regular rate and rhythm without murmur, gallop or rub.  Lungs: Clear without rales or wheezes. Equal expansion is noted.   Extremities: Show no edema. DP 1+ bilaterally. Unable to palpate PT bilaterally.   Skin: Warm and dry.  Neurologic: Nonfocal.     Diagnostic Data:  Lab Results   Component Value Date    GLUCOSE 116 (H) 06/25/2018    BUN 25 (H) 06/25/2018    CREATININE 1.05 06/25/2018    EGFRIFNONA 72 06/25/2018    BCR 23.8 06/25/2018    K 4.7 06/25/2018    CO2 28.0 06/25/2018    CALCIUM 8.7 06/25/2018    ALBUMIN 4.27 06/25/2018    AST 27 06/25/2018    ALT 23 06/25/2018     Lab Results   Component Value Date    GLUCOSE 116 (H) 06/25/2018    CALCIUM 8.7 06/25/2018     06/25/2018    K 4.7 06/25/2018    CO2 28.0 06/25/2018     06/25/2018    BUN 25 (H) 06/25/2018    CREATININE 1.05 06/25/2018    EGFRIFNONA 72 06/25/2018    BCR 23.8 06/25/2018    ANIONGAP 6.0 06/25/2018     Lab Results   Component Value Date    CHOL 105 06/26/2018    TRIG 133 06/26/2018    HDL 29 (L) 06/26/2018    LDL 61 06/26/2018     Lab Results   Component Value Date    WBC 4.07 06/25/2018    HGB 11.9 (L) 06/25/2018    HCT 34.5 (L) 06/25/2018    .1 (H) 06/25/2018     06/25/2018     No results found for: TSH    Procedures      ASSESSMENT:    ICD-10-CM ICD-9-CM   1. Coronary artery disease involving native coronary artery of native heart without angina pectoris  " I25.10 414.01   2. Dyslipidemia  E78.5 272.4   3. Primary hypertension  I10 401.9   4. PVD (peripheral vascular disease) (HCC)  I73.9 443.9     Lab results found above were reviewed with the patient.      PLAN:  1. Continue ASA, Crestor and Xarelto for CAD, PVD.   2. Patient was counseled to begin aerobic exercise 30 min per day for at least 4 days per week.   3. Nuclear GXT for CDL license.   4. Exercise JUDY for claudication, PVD.   5. Continue all other current medications.  6. F/up in 12 months, sooner if needed.      Electronically signed by JAI Ferraro, 03/29/22, 1:58 PM EDT.

## 2022-05-18 ENCOUNTER — HOSPITAL ENCOUNTER (OUTPATIENT)
Dept: CARDIOLOGY | Facility: HOSPITAL | Age: 67
End: 2022-05-18

## 2022-05-18 ENCOUNTER — APPOINTMENT (OUTPATIENT)
Dept: CARDIOLOGY | Facility: HOSPITAL | Age: 67
End: 2022-05-18

## 2022-05-24 RX ORDER — ROSUVASTATIN CALCIUM 20 MG/1
TABLET, COATED ORAL
Qty: 90 TABLET | Refills: 0 | Status: SHIPPED | OUTPATIENT
Start: 2022-05-24 | End: 2022-09-06

## 2022-09-06 RX ORDER — ROSUVASTATIN CALCIUM 20 MG/1
TABLET, COATED ORAL
Qty: 90 TABLET | Refills: 0 | Status: SHIPPED | OUTPATIENT
Start: 2022-09-06

## 2022-09-08 ENCOUNTER — HOSPITAL ENCOUNTER (OUTPATIENT)
Age: 67
End: 2022-09-08
Payer: MEDICARE

## 2022-09-08 DIAGNOSIS — R06.09: Primary | ICD-10-CM

## 2022-09-08 DIAGNOSIS — D64.9: ICD-10-CM

## 2022-09-08 PROCEDURE — 86850 RBC ANTIBODY SCREEN: CPT

## 2022-09-08 PROCEDURE — 36415 COLL VENOUS BLD VENIPUNCTURE: CPT

## 2022-09-09 ENCOUNTER — HOSPITAL ENCOUNTER (OUTPATIENT)
Age: 67
Discharge: HOME | End: 2022-09-09
Payer: MEDICARE

## 2022-09-09 VITALS
TEMPERATURE: 97.52 F | OXYGEN SATURATION: 100 % | SYSTOLIC BLOOD PRESSURE: 126 MMHG | RESPIRATION RATE: 18 BRPM | DIASTOLIC BLOOD PRESSURE: 63 MMHG | HEART RATE: 64 BPM

## 2022-09-09 VITALS
DIASTOLIC BLOOD PRESSURE: 61 MMHG | HEART RATE: 63 BPM | TEMPERATURE: 97.8 F | RESPIRATION RATE: 18 BRPM | SYSTOLIC BLOOD PRESSURE: 92 MMHG | OXYGEN SATURATION: 100 %

## 2022-09-09 VITALS
OXYGEN SATURATION: 100 % | TEMPERATURE: 97.6 F | DIASTOLIC BLOOD PRESSURE: 59 MMHG | HEART RATE: 58 BPM | RESPIRATION RATE: 18 BRPM | SYSTOLIC BLOOD PRESSURE: 101 MMHG

## 2022-09-09 VITALS
TEMPERATURE: 97.52 F | SYSTOLIC BLOOD PRESSURE: 126 MMHG | DIASTOLIC BLOOD PRESSURE: 63 MMHG | RESPIRATION RATE: 18 BRPM | OXYGEN SATURATION: 100 % | HEART RATE: 64 BPM

## 2022-09-09 VITALS
HEART RATE: 66 BPM | RESPIRATION RATE: 18 BRPM | DIASTOLIC BLOOD PRESSURE: 44 MMHG | SYSTOLIC BLOOD PRESSURE: 112 MMHG | TEMPERATURE: 97.52 F | OXYGEN SATURATION: 99 %

## 2022-09-09 VITALS
HEART RATE: 60 BPM | TEMPERATURE: 97.5 F | SYSTOLIC BLOOD PRESSURE: 116 MMHG | DIASTOLIC BLOOD PRESSURE: 55 MMHG | RESPIRATION RATE: 18 BRPM | OXYGEN SATURATION: 100 %

## 2022-09-09 VITALS
HEART RATE: 63 BPM | SYSTOLIC BLOOD PRESSURE: 118 MMHG | TEMPERATURE: 97.88 F | OXYGEN SATURATION: 100 % | DIASTOLIC BLOOD PRESSURE: 68 MMHG | RESPIRATION RATE: 18 BRPM

## 2022-09-09 VITALS
DIASTOLIC BLOOD PRESSURE: 52 MMHG | TEMPERATURE: 97.5 F | OXYGEN SATURATION: 100 % | RESPIRATION RATE: 18 BRPM | SYSTOLIC BLOOD PRESSURE: 100 MMHG | HEART RATE: 53 BPM

## 2022-09-09 VITALS
OXYGEN SATURATION: 100 % | HEART RATE: 59 BPM | DIASTOLIC BLOOD PRESSURE: 65 MMHG | SYSTOLIC BLOOD PRESSURE: 116 MMHG | TEMPERATURE: 97.52 F | RESPIRATION RATE: 18 BRPM

## 2022-09-09 VITALS
TEMPERATURE: 97.52 F | HEART RATE: 59 BPM | SYSTOLIC BLOOD PRESSURE: 126 MMHG | DIASTOLIC BLOOD PRESSURE: 64 MMHG | RESPIRATION RATE: 18 BRPM | OXYGEN SATURATION: 100 %

## 2022-09-09 VITALS — BODY MASS INDEX: 25.8 KG/M2

## 2022-09-09 VITALS
RESPIRATION RATE: 18 BRPM | TEMPERATURE: 97.6 F | OXYGEN SATURATION: 100 % | HEART RATE: 58 BPM | SYSTOLIC BLOOD PRESSURE: 123 MMHG | DIASTOLIC BLOOD PRESSURE: 65 MMHG

## 2022-09-09 VITALS
OXYGEN SATURATION: 100 % | HEART RATE: 63 BPM | RESPIRATION RATE: 18 BRPM | DIASTOLIC BLOOD PRESSURE: 50 MMHG | TEMPERATURE: 97.52 F | SYSTOLIC BLOOD PRESSURE: 108 MMHG

## 2022-09-09 VITALS
SYSTOLIC BLOOD PRESSURE: 125 MMHG | DIASTOLIC BLOOD PRESSURE: 57 MMHG | OXYGEN SATURATION: 100 % | HEART RATE: 70 BPM | RESPIRATION RATE: 18 BRPM | TEMPERATURE: 97.52 F

## 2022-09-09 VITALS
DIASTOLIC BLOOD PRESSURE: 48 MMHG | TEMPERATURE: 97.6 F | HEART RATE: 56 BPM | SYSTOLIC BLOOD PRESSURE: 104 MMHG | RESPIRATION RATE: 18 BRPM | OXYGEN SATURATION: 99 %

## 2022-09-09 VITALS
RESPIRATION RATE: 18 BRPM | SYSTOLIC BLOOD PRESSURE: 127 MMHG | TEMPERATURE: 97.52 F | DIASTOLIC BLOOD PRESSURE: 60 MMHG | HEART RATE: 64 BPM | OXYGEN SATURATION: 99 %

## 2022-09-09 VITALS
DIASTOLIC BLOOD PRESSURE: 52 MMHG | RESPIRATION RATE: 18 BRPM | OXYGEN SATURATION: 100 % | TEMPERATURE: 98.06 F | HEART RATE: 58 BPM | SYSTOLIC BLOOD PRESSURE: 95 MMHG

## 2022-09-09 VITALS
RESPIRATION RATE: 18 BRPM | TEMPERATURE: 97.7 F | SYSTOLIC BLOOD PRESSURE: 109 MMHG | HEART RATE: 63 BPM | OXYGEN SATURATION: 100 % | DIASTOLIC BLOOD PRESSURE: 60 MMHG

## 2022-09-09 VITALS
RESPIRATION RATE: 18 BRPM | DIASTOLIC BLOOD PRESSURE: 57 MMHG | SYSTOLIC BLOOD PRESSURE: 125 MMHG | OXYGEN SATURATION: 100 % | HEART RATE: 70 BPM | TEMPERATURE: 97.6 F

## 2022-09-09 VITALS
RESPIRATION RATE: 18 BRPM | DIASTOLIC BLOOD PRESSURE: 52 MMHG | OXYGEN SATURATION: 99 % | HEART RATE: 66 BPM | TEMPERATURE: 97.6 F | SYSTOLIC BLOOD PRESSURE: 98 MMHG

## 2022-09-09 VITALS
HEART RATE: 65 BPM | OXYGEN SATURATION: 99 % | DIASTOLIC BLOOD PRESSURE: 64 MMHG | RESPIRATION RATE: 18 BRPM | TEMPERATURE: 97.7 F | SYSTOLIC BLOOD PRESSURE: 105 MMHG

## 2022-09-09 DIAGNOSIS — R06.09: Primary | ICD-10-CM

## 2022-09-09 DIAGNOSIS — D64.9: ICD-10-CM

## 2022-09-09 LAB
HCT VFR BLD CALC: 21.5 % (ref 42–52)
HCT VFR BLD CALC: 29 % (ref 42–52)
HGB BLD-MCNC: 7.7 G/DL (ref 14.1–18)
HGB BLD-MCNC: 9.9 G/DL (ref 14.1–18)

## 2022-09-09 PROCEDURE — G0328 FECAL BLOOD SCRN IMMUNOASSAY: HCPCS

## 2022-09-09 PROCEDURE — 85018 HEMOGLOBIN: CPT

## 2022-09-09 PROCEDURE — 96372 THER/PROPH/DIAG INJ SC/IM: CPT

## 2022-09-09 PROCEDURE — 36430 TRANSFUSION BLD/BLD COMPNT: CPT

## 2022-09-09 PROCEDURE — 82272 OCCULT BLD FECES 1-3 TESTS: CPT

## 2022-09-09 PROCEDURE — P9016 RBC LEUKOCYTES REDUCED: HCPCS

## 2022-09-09 PROCEDURE — 85014 HEMATOCRIT: CPT

## 2022-09-12 ENCOUNTER — HOSPITAL ENCOUNTER (OUTPATIENT)
Age: 67
End: 2022-09-12
Payer: MEDICARE

## 2022-09-12 DIAGNOSIS — R06.02: Primary | ICD-10-CM

## 2022-09-12 DIAGNOSIS — I73.9: ICD-10-CM

## 2022-09-12 PROCEDURE — 93306 TTE W/DOPPLER COMPLETE: CPT

## 2022-09-12 PROCEDURE — 93923 UPR/LXTR ART STDY 3+ LVLS: CPT

## 2022-09-16 ENCOUNTER — HOSPITAL ENCOUNTER (OUTPATIENT)
Age: 67
End: 2022-09-16
Payer: MEDICARE

## 2022-09-16 DIAGNOSIS — K92.1: Primary | ICD-10-CM

## 2022-09-22 ENCOUNTER — HOSPITAL ENCOUNTER (OUTPATIENT)
Age: 67
End: 2022-09-22
Payer: MEDICARE

## 2022-09-22 DIAGNOSIS — E53.8: ICD-10-CM

## 2022-09-22 DIAGNOSIS — D64.9: Primary | ICD-10-CM

## 2022-09-22 LAB
FERRITIN SERPL-MCNC: 110 NG/ML (ref 17.9–464)
HCT VFR BLD CALC: 37.9 % (ref 42–52)
HGB BLD-MCNC: 12.3 G/DL (ref 14.1–18)
IRON SERPL QL: 73 UG/DL (ref 49–181)
MCHC RBC-ENTMCNC: 32.5 G/DL (ref 31.8–35.4)
MCV RBC: 111.6 FL (ref 80–94)
MEAN CORPUSCULAR HEMOGLOBIN: 36.3 PG (ref 27–31.2)
PLATELET # BLD: 305 K/MM3 (ref 142–424)
RBC # BLD AUTO: 3.39 M/MM3 (ref 4.6–6.2)
TOTAL IRON BINDING CAPACITY: 293 UG/DL (ref 261–462)
VITAMIN B12: 800 PG/ML (ref 239–931)
WBC # BLD AUTO: 6.6 K/MM3 (ref 4.8–10.8)

## 2022-09-22 PROCEDURE — 82607 VITAMIN B-12: CPT

## 2022-09-22 PROCEDURE — 82728 ASSAY OF FERRITIN: CPT

## 2022-09-22 PROCEDURE — 83540 ASSAY OF IRON: CPT

## 2022-09-22 PROCEDURE — 36415 COLL VENOUS BLD VENIPUNCTURE: CPT

## 2022-09-22 PROCEDURE — 83550 IRON BINDING TEST: CPT

## 2022-09-22 PROCEDURE — 85025 COMPLETE CBC W/AUTO DIFF WBC: CPT

## 2022-10-05 ENCOUNTER — HOSPITAL ENCOUNTER (OUTPATIENT)
Dept: CARDIOLOGY | Facility: HOSPITAL | Age: 67
Discharge: HOME | End: 2022-10-05

## 2022-10-05 VITALS — BODY MASS INDEX: 25.77 KG/M2 | HEIGHT: 70 IN | WEIGHT: 180 LBS

## 2022-10-05 DIAGNOSIS — I25.10 CORONARY ARTERY DISEASE INVOLVING NATIVE CORONARY ARTERY OF NATIVE HEART WITHOUT ANGINA PECTORIS: ICD-10-CM

## 2022-10-05 DIAGNOSIS — I73.9 PVD (PERIPHERAL VASCULAR DISEASE): ICD-10-CM

## 2022-10-05 LAB
BH CV LOWER ARTERIAL LEFT ABI RATIO: 0.73
BH CV LOWER ARTERIAL LEFT DORSALIS PEDIS SYS MAX: 84
BH CV LOWER ARTERIAL LEFT GREAT TOE SYS MAX: 65
BH CV LOWER ARTERIAL LEFT POST EX ABI RATIO: 0.34
BH CV LOWER ARTERIAL LEFT POST TIBIAL SYS MAX: 92
BH CV LOWER ARTERIAL LEFT TBI RATIO: 0.52
BH CV LOWER ARTERIAL RIGHT ABI RATIO: 0.83
BH CV LOWER ARTERIAL RIGHT DORSALIS PEDIS SYS MAX: 104
BH CV LOWER ARTERIAL RIGHT GREAT TOE SYS MAX: 56
BH CV LOWER ARTERIAL RIGHT POST EX ABI RATIO: 0.47
BH CV LOWER ARTERIAL RIGHT POST TIBIAL SYS MAX: 81
BH CV LOWER ARTERIAL RIGHT TBI RATIO: 0.44
MAXIMAL PREDICTED HEART RATE: 155 BPM
STRESS TARGET HR: 132 BPM
UPPER ARTERIAL LEFT ARM BRACHIAL SYS MAX: 120 MMHG
UPPER ARTERIAL RIGHT ARM BRACHIAL SYS MAX: 126 MMHG

## 2022-10-05 PROCEDURE — 93924 LWR XTR VASC STDY BILAT: CPT | Performed by: INTERNAL MEDICINE

## 2022-10-05 PROCEDURE — 25010000002 REGADENOSON 0.4 MG/5ML SOLUTION: Performed by: NURSE PRACTITIONER

## 2022-10-05 PROCEDURE — 78452 HT MUSCLE IMAGE SPECT MULT: CPT

## 2022-10-05 PROCEDURE — 78452 HT MUSCLE IMAGE SPECT MULT: CPT | Performed by: INTERNAL MEDICINE

## 2022-10-05 PROCEDURE — 93017 CV STRESS TEST TRACING ONLY: CPT

## 2022-10-05 PROCEDURE — 93018 CV STRESS TEST I&R ONLY: CPT | Performed by: INTERNAL MEDICINE

## 2022-10-05 PROCEDURE — 93924 LWR XTR VASC STDY BILAT: CPT

## 2022-10-05 PROCEDURE — A9500 TC99M SESTAMIBI: HCPCS | Performed by: NURSE PRACTITIONER

## 2022-10-05 PROCEDURE — 0 TECHNETIUM SESTAMIBI: Performed by: NURSE PRACTITIONER

## 2022-10-05 RX ADMIN — REGADENOSON 0.4 MG: 0.08 INJECTION, SOLUTION INTRAVENOUS at 10:06

## 2022-10-05 RX ADMIN — TECHNETIUM TC 99M SESTAMIBI 1 DOSE: 1 INJECTION INTRAVENOUS at 08:42

## 2022-10-05 RX ADMIN — TECHNETIUM TC 99M SESTAMIBI 1 DOSE: 1 INJECTION INTRAVENOUS at 10:10

## 2022-10-06 LAB
BH CV REST NUCLEAR ISOTOPE DOSE: 9.5 MCI
BH CV STRESS BP STAGE 1: NORMAL
BH CV STRESS BP STAGE 3: NORMAL
BH CV STRESS COMMENTS STAGE 1: NORMAL
BH CV STRESS DOSE REGADENOSON STAGE 1: 0.4
BH CV STRESS DURATION MIN STAGE 1: 1
BH CV STRESS DURATION MIN STAGE 2: 1
BH CV STRESS DURATION MIN STAGE 3: 1
BH CV STRESS DURATION MIN STAGE 4: 1
BH CV STRESS DURATION SEC STAGE 1: 0
BH CV STRESS DURATION SEC STAGE 2: 0
BH CV STRESS DURATION SEC STAGE 3: 0
BH CV STRESS DURATION SEC STAGE 4: 0
BH CV STRESS HR STAGE 1: 64
BH CV STRESS HR STAGE 2: 90
BH CV STRESS HR STAGE 3: 77
BH CV STRESS HR STAGE 4: 75
BH CV STRESS NUCLEAR ISOTOPE DOSE: 32.3 MCI
BH CV STRESS O2 STAGE 1: 96
BH CV STRESS O2 STAGE 2: 96
BH CV STRESS O2 STAGE 3: 100
BH CV STRESS O2 STAGE 4: 100
BH CV STRESS PROTOCOL 1: NORMAL
BH CV STRESS RECOVERY BP: NORMAL MMHG
BH CV STRESS RECOVERY HR: 78 BPM
BH CV STRESS RECOVERY O2: 100 %
BH CV STRESS STAGE 1: 1
BH CV STRESS STAGE 2: 2
BH CV STRESS STAGE 3: 3
BH CV STRESS STAGE 4: 4
LV EF NUC BP: 69 %
MAXIMAL PREDICTED HEART RATE: 155 BPM
PERCENT MAX PREDICTED HR: 60 %
STRESS BASELINE BP: NORMAL MMHG
STRESS BASELINE HR: 58 BPM
STRESS O2 SAT REST: 100 %
STRESS PERCENT HR: 71 %
STRESS POST ESTIMATED WORKLOAD: 1 METS
STRESS POST EXERCISE DUR MIN: 4 MIN
STRESS POST EXERCISE DUR SEC: 0 SEC
STRESS POST O2 SAT PEAK: 100 %
STRESS POST PEAK BP: NORMAL MMHG
STRESS POST PEAK HR: 93 BPM
STRESS TARGET HR: 132 BPM

## 2022-10-13 ENCOUNTER — HOSPITAL ENCOUNTER (OUTPATIENT)
Age: 67
End: 2022-10-13
Payer: MEDICARE

## 2022-10-13 DIAGNOSIS — D64.9: Primary | ICD-10-CM

## 2022-10-13 LAB
HCT VFR BLD CALC: 44 % (ref 42–52)
HGB BLD-MCNC: 14.4 G/DL (ref 14.1–18)
MCHC RBC-ENTMCNC: 32.7 G/DL (ref 31.8–35.4)
MCV RBC: 105.4 FL (ref 80–94)
MEAN CORPUSCULAR HEMOGLOBIN: 34.5 PG (ref 27–31.2)
PLATELET # BLD: 215 K/MM3 (ref 142–424)
RBC # BLD AUTO: 4.17 M/MM3 (ref 4.6–6.2)
VITAMIN B12: 787 PG/ML (ref 239–931)
WBC # BLD AUTO: 7.2 K/MM3 (ref 4.8–10.8)

## 2022-10-13 PROCEDURE — 85025 COMPLETE CBC W/AUTO DIFF WBC: CPT

## 2022-10-13 PROCEDURE — 36415 COLL VENOUS BLD VENIPUNCTURE: CPT

## 2022-10-13 PROCEDURE — 82607 VITAMIN B-12: CPT

## 2022-10-14 ENCOUNTER — TELEPHONE (OUTPATIENT)
Dept: CARDIOLOGY | Facility: CLINIC | Age: 67
End: 2022-10-14

## 2022-10-14 NOTE — TELEPHONE ENCOUNTER
Relayed results of stress test and JUDY- per Dr. Gleason, pt may see Dr. Camejo or Dr. Guerrero if he wishes.     Pt's wife will call back next week with their decision     Confirmed he has stopped smoking (several years ago) and he is taking Xarelto 2.5 mg BID

## 2022-11-15 PROCEDURE — 0DJ08ZZ INSPECTION OF UPPER INTESTINAL TRACT, VIA NATURAL OR ARTIFICIAL OPENING ENDOSCOPIC: ICD-10-PCS | Performed by: SURGERY

## 2022-12-20 ENCOUNTER — HOSPITAL ENCOUNTER (OUTPATIENT)
Age: 67
Discharge: HOME | End: 2022-12-20
Payer: MEDICARE

## 2022-12-20 VITALS
SYSTOLIC BLOOD PRESSURE: 114 MMHG | HEART RATE: 66 BPM | OXYGEN SATURATION: 97 % | RESPIRATION RATE: 16 BRPM | DIASTOLIC BLOOD PRESSURE: 72 MMHG

## 2022-12-20 VITALS
RESPIRATION RATE: 16 BRPM | HEART RATE: 62 BPM | SYSTOLIC BLOOD PRESSURE: 104 MMHG | OXYGEN SATURATION: 96 % | DIASTOLIC BLOOD PRESSURE: 68 MMHG

## 2022-12-20 VITALS
DIASTOLIC BLOOD PRESSURE: 60 MMHG | RESPIRATION RATE: 16 BRPM | HEART RATE: 116 BPM | TEMPERATURE: 97.7 F | OXYGEN SATURATION: 97 % | SYSTOLIC BLOOD PRESSURE: 158 MMHG

## 2022-12-20 VITALS
RESPIRATION RATE: 16 BRPM | SYSTOLIC BLOOD PRESSURE: 114 MMHG | OXYGEN SATURATION: 97 % | HEART RATE: 66 BPM | DIASTOLIC BLOOD PRESSURE: 72 MMHG

## 2022-12-20 VITALS
OXYGEN SATURATION: 95 % | DIASTOLIC BLOOD PRESSURE: 56 MMHG | SYSTOLIC BLOOD PRESSURE: 91 MMHG | RESPIRATION RATE: 16 BRPM | HEART RATE: 51 BPM | TEMPERATURE: 97.3 F

## 2022-12-20 VITALS — BODY MASS INDEX: 26.6 KG/M2

## 2022-12-20 VITALS — OXYGEN SATURATION: 97 %

## 2022-12-20 DIAGNOSIS — D12.3: ICD-10-CM

## 2022-12-20 DIAGNOSIS — Z12.11: Primary | ICD-10-CM

## 2022-12-20 DIAGNOSIS — Z79.899: ICD-10-CM

## 2022-12-20 DIAGNOSIS — D64.9: ICD-10-CM

## 2022-12-20 PROCEDURE — 45385 COLONOSCOPY W/LESION REMOVAL: CPT

## 2022-12-20 PROCEDURE — 88305 TISSUE EXAM BY PATHOLOGIST: CPT

## 2022-12-20 PROCEDURE — 43239 EGD BIOPSY SINGLE/MULTIPLE: CPT

## 2022-12-20 PROCEDURE — 93005 ELECTROCARDIOGRAM TRACING: CPT

## 2022-12-28 ENCOUNTER — HOSPITAL ENCOUNTER (OUTPATIENT)
Age: 67
End: 2022-12-28
Payer: MEDICARE

## 2022-12-28 DIAGNOSIS — D64.9: Primary | ICD-10-CM

## 2022-12-28 LAB
HCT VFR BLD CALC: 47.2 % (ref 42–52)
HGB BLD-MCNC: 15.4 G/DL (ref 14.1–18)

## 2022-12-28 PROCEDURE — 85014 HEMATOCRIT: CPT

## 2022-12-28 PROCEDURE — 85018 HEMOGLOBIN: CPT

## 2023-01-04 ENCOUNTER — HOSPITAL ENCOUNTER (OUTPATIENT)
Age: 68
End: 2023-01-04
Payer: MEDICARE

## 2023-01-04 DIAGNOSIS — R13.10: Primary | ICD-10-CM

## (undated) DEVICE — Device

## (undated) DEVICE — PK CATH CARD 10

## (undated) DEVICE — DRSNG SURESITE123 4X4.8IN

## (undated) DEVICE — VIPERTRACK, 50 PACK: Brand: VIPERTRACK

## (undated) DEVICE — KT MANIFOLD CATHLAB CUST

## (undated) DEVICE — QUICK-CROSS™ SUPPORT CATHETER: Brand: QUICK-CROSS™

## (undated) DEVICE — DEV INFL MONARCH 25W

## (undated) DEVICE — ATHERECTOMY H1-LS HAWKONE 7F STD TIP US: Brand: HAWKONE™

## (undated) DEVICE — RADIFOCUS GLIDEWIRE ADVANTAGE GUIDEWIRE: Brand: GLIDEWIRE ADVANTAGE

## (undated) DEVICE — PINNACLE INTRODUCER SHEATH: Brand: PINNACLE

## (undated) DEVICE — PRESSURE MONITORING SET: Brand: TRUWAVE, VAMP

## (undated) DEVICE — GW J TP FIX CORE .035 150

## (undated) DEVICE — DEV EPS SPIDERFX 6MM OTW320/RX190CM

## (undated) DEVICE — CATH DIAG EXPO M/ PK 6FR FL4/FR4 PIG 3PK

## (undated) DEVICE — RADIFOCUS TORQUE DEVICE MULTI-TORQUE VISE: Brand: RADIFOCUS TORQUE DEVICE

## (undated) DEVICE — DESTINATION RENAL GUIDING SHEATH: Brand: DESTINATION

## (undated) DEVICE — SOL NS 500ML

## (undated) DEVICE — DRSNG SURESITE123 4X10IN

## (undated) DEVICE — DRVR HAWK1 CUT